# Patient Record
Sex: MALE | Race: WHITE | NOT HISPANIC OR LATINO | Employment: FULL TIME | URBAN - METROPOLITAN AREA
[De-identification: names, ages, dates, MRNs, and addresses within clinical notes are randomized per-mention and may not be internally consistent; named-entity substitution may affect disease eponyms.]

---

## 2017-05-18 ENCOUNTER — OFFICE VISIT (OUTPATIENT)
Dept: FAMILY MEDICINE CLINIC | Facility: CLINIC | Age: 37
End: 2017-05-18

## 2017-05-18 VITALS
TEMPERATURE: 98.2 F | DIASTOLIC BLOOD PRESSURE: 70 MMHG | BODY MASS INDEX: 29.69 KG/M2 | SYSTOLIC BLOOD PRESSURE: 108 MMHG | HEIGHT: 73 IN | OXYGEN SATURATION: 98 % | WEIGHT: 224 LBS | HEART RATE: 68 BPM

## 2017-05-18 DIAGNOSIS — E66.3 OVERWEIGHT: ICD-10-CM

## 2017-05-18 DIAGNOSIS — F41.9 ANXIETY: ICD-10-CM

## 2017-05-18 DIAGNOSIS — Z13.9 SCREENING: ICD-10-CM

## 2017-05-18 DIAGNOSIS — Z63.0 MARITAL PROBLEM: ICD-10-CM

## 2017-05-18 DIAGNOSIS — R53.83 OTHER FATIGUE: ICD-10-CM

## 2017-05-18 DIAGNOSIS — R68.82 LIBIDO, DECREASED: ICD-10-CM

## 2017-05-18 DIAGNOSIS — M54.31 SCIATICA OF RIGHT SIDE: ICD-10-CM

## 2017-05-18 DIAGNOSIS — Z00.00 HEALTH MAINTENANCE EXAMINATION: Primary | ICD-10-CM

## 2017-05-18 DIAGNOSIS — N52.9 ERECTILE DYSFUNCTION, UNSPECIFIED ERECTILE DYSFUNCTION TYPE: ICD-10-CM

## 2017-05-18 PROCEDURE — 99395 PREV VISIT EST AGE 18-39: CPT | Performed by: NURSE PRACTITIONER

## 2017-05-18 RX ORDER — METHYLPREDNISOLONE 4 MG/1
TABLET ORAL
Qty: 21 TABLET | Refills: 0 | Status: SHIPPED | OUTPATIENT
Start: 2017-05-18 | End: 2018-11-07

## 2017-05-18 RX ORDER — SILDENAFIL 100 MG/1
100 TABLET, FILM COATED ORAL DAILY PRN
Qty: 6 TABLET | Refills: 11 | Status: SHIPPED | OUTPATIENT
Start: 2017-05-18 | End: 2017-05-18 | Stop reason: SDUPTHER

## 2017-05-18 RX ORDER — IBUPROFEN 800 MG/1
800 TABLET ORAL EVERY 8 HOURS PRN
Qty: 90 TABLET | Refills: 1 | Status: SHIPPED | OUTPATIENT
Start: 2017-05-18 | End: 2018-11-07

## 2017-05-18 RX ORDER — CETIRIZINE HYDROCHLORIDE 10 MG/1
10 TABLET ORAL DAILY
COMMUNITY
End: 2021-09-08

## 2017-05-18 RX ORDER — IBUPROFEN 800 MG/1
800 TABLET ORAL EVERY 8 HOURS PRN
Qty: 90 TABLET | Refills: 1 | Status: SHIPPED | OUTPATIENT
Start: 2017-05-18 | End: 2017-05-18 | Stop reason: SDUPTHER

## 2017-05-18 RX ORDER — METHYLPREDNISOLONE 4 MG/1
TABLET ORAL
Qty: 21 TABLET | Refills: 0 | Status: SHIPPED | OUTPATIENT
Start: 2017-05-18 | End: 2017-05-18 | Stop reason: SDUPTHER

## 2017-05-18 RX ORDER — SILDENAFIL 100 MG/1
100 TABLET, FILM COATED ORAL DAILY PRN
Qty: 6 TABLET | Refills: 11 | Status: SHIPPED | OUTPATIENT
Start: 2017-05-18 | End: 2022-10-28

## 2017-05-18 SDOH — SOCIAL STABILITY - SOCIAL INSECURITY: PROBLEMS IN RELATIONSHIP WITH SPOUSE OR PARTNER: Z63.0

## 2017-06-09 LAB
25(OH)D3+25(OH)D2 SERPL-MCNC: 26.7 NG/ML (ref 30–100)
ALBUMIN SERPL-MCNC: 4.5 G/DL (ref 3.5–5.2)
ALBUMIN/GLOB SERPL: 1.7 G/DL
ALP SERPL-CCNC: 64 U/L (ref 39–117)
ALT SERPL-CCNC: 16 U/L (ref 1–41)
APPEARANCE UR: CLEAR
AST SERPL-CCNC: 19 U/L (ref 1–40)
BASOPHILS # BLD AUTO: 0.01 10*3/MM3 (ref 0–0.2)
BASOPHILS NFR BLD AUTO: 0.2 % (ref 0–1.5)
BILIRUB SERPL-MCNC: 0.5 MG/DL (ref 0.1–1.2)
BILIRUB UR QL STRIP: NEGATIVE
BUN SERPL-MCNC: 13 MG/DL (ref 6–20)
BUN/CREAT SERPL: 15.9 (ref 7–25)
CALCIUM SERPL-MCNC: 10.1 MG/DL (ref 8.6–10.5)
CHLORIDE SERPL-SCNC: 103 MMOL/L (ref 98–107)
CHOLEST SERPL-MCNC: 147 MG/DL (ref 0–200)
CO2 SERPL-SCNC: 26.9 MMOL/L (ref 22–29)
COLOR UR: YELLOW
CONV COMMENT: ABNORMAL
CREAT SERPL-MCNC: 0.82 MG/DL (ref 0.76–1.27)
EOSINOPHIL # BLD AUTO: 0.15 10*3/MM3 (ref 0–0.7)
EOSINOPHIL NFR BLD AUTO: 2.6 % (ref 0.3–6.2)
ERYTHROCYTE [DISTWIDTH] IN BLOOD BY AUTOMATED COUNT: 13.9 % (ref 11.5–14.5)
FERRITIN SERPL-MCNC: 268.9 NG/ML (ref 30–400)
FOLATE SERPL-MCNC: 14.38 NG/ML (ref 4.78–24.2)
GLOBULIN SER CALC-MCNC: 2.6 GM/DL
GLUCOSE SERPL-MCNC: 112 MG/DL (ref 65–99)
GLUCOSE UR QL: NEGATIVE
HCT VFR BLD AUTO: 43.3 % (ref 40.4–52.2)
HDLC SERPL-MCNC: 52 MG/DL (ref 40–60)
HGB BLD-MCNC: 14.3 G/DL (ref 13.7–17.6)
HGB UR QL STRIP: NEGATIVE
IMM GRANULOCYTES # BLD: 0 10*3/MM3 (ref 0–0.03)
IMM GRANULOCYTES NFR BLD: 0 % (ref 0–0.5)
KETONES UR QL STRIP: NEGATIVE
LDLC SERPL CALC-MCNC: 86 MG/DL (ref 0–100)
LDLC/HDLC SERPL: 1.65 {RATIO}
LEUKOCYTE ESTERASE UR QL STRIP: NEGATIVE
LYMPHOCYTES # BLD AUTO: 2.08 10*3/MM3 (ref 0.9–4.8)
LYMPHOCYTES NFR BLD AUTO: 35.4 % (ref 19.6–45.3)
MCH RBC QN AUTO: 30.8 PG (ref 27–32.7)
MCHC RBC AUTO-ENTMCNC: 33 G/DL (ref 32.6–36.4)
MCV RBC AUTO: 93.3 FL (ref 79.8–96.2)
MONOCYTES # BLD AUTO: 0.53 10*3/MM3 (ref 0.2–1.2)
MONOCYTES NFR BLD AUTO: 9 % (ref 5–12)
NEUTROPHILS # BLD AUTO: 3.11 10*3/MM3 (ref 1.9–8.1)
NEUTROPHILS NFR BLD AUTO: 52.8 % (ref 42.7–76)
NITRITE UR QL STRIP: NEGATIVE
PH UR STRIP: 7.5 [PH] (ref 5–8)
PLATELET # BLD AUTO: 252 10*3/MM3 (ref 140–500)
POTASSIUM SERPL-SCNC: 4.2 MMOL/L (ref 3.5–5.2)
PROT SERPL-MCNC: 7.1 G/DL (ref 6–8.5)
PROT UR QL STRIP: NEGATIVE
RBC # BLD AUTO: 4.64 10*6/MM3 (ref 4.6–6)
SODIUM SERPL-SCNC: 144 MMOL/L (ref 136–145)
SP GR UR: 1.01 (ref 1–1.03)
TESTOST FREE SERPL-MCNC: 10.9 PG/ML (ref 8.7–25.1)
TESTOST SERPL-MCNC: 337 NG/DL (ref 348–1197)
TRIGL SERPL-MCNC: 46 MG/DL (ref 0–150)
TSH SERPL DL<=0.005 MIU/L-ACNC: 2.48 MIU/ML (ref 0.27–4.2)
UROBILINOGEN UR STRIP-MCNC: NORMAL MG/DL
VIT B12 SERPL-MCNC: 445 PG/ML (ref 211–946)
VLDLC SERPL CALC-MCNC: 9.2 MG/DL (ref 5–40)
WBC # BLD AUTO: 5.88 10*3/MM3 (ref 4.5–10.7)

## 2017-06-13 ENCOUNTER — TELEPHONE (OUTPATIENT)
Dept: FAMILY MEDICINE CLINIC | Facility: CLINIC | Age: 37
End: 2017-06-13

## 2017-06-14 NOTE — TELEPHONE ENCOUNTER
His vitamin D is borderline low  Testosterone mixed results  Free testosterone low normal  Total testosterone borderline low  Glucose mildly elevated for fasting 111    We can discuss these further upcoming visit

## 2017-06-15 ENCOUNTER — TELEPHONE (OUTPATIENT)
Dept: FAMILY MEDICINE CLINIC | Facility: CLINIC | Age: 37
End: 2017-06-15

## 2018-11-07 ENCOUNTER — OFFICE VISIT (OUTPATIENT)
Dept: FAMILY MEDICINE CLINIC | Facility: CLINIC | Age: 38
End: 2018-11-07

## 2018-11-07 ENCOUNTER — TELEPHONE (OUTPATIENT)
Dept: FAMILY MEDICINE CLINIC | Facility: CLINIC | Age: 38
End: 2018-11-07

## 2018-11-07 VITALS
BODY MASS INDEX: 34.06 KG/M2 | HEART RATE: 80 BPM | DIASTOLIC BLOOD PRESSURE: 70 MMHG | HEIGHT: 73 IN | WEIGHT: 257 LBS | OXYGEN SATURATION: 98 % | SYSTOLIC BLOOD PRESSURE: 112 MMHG

## 2018-11-07 DIAGNOSIS — F41.9 ANXIETY: ICD-10-CM

## 2018-11-07 DIAGNOSIS — R10.13 EPIGASTRIC PAIN: Primary | ICD-10-CM

## 2018-11-07 LAB
ALBUMIN SERPL-MCNC: 4.4 G/DL (ref 3.5–5.2)
ALBUMIN/GLOB SERPL: 1.5 G/DL
ALP SERPL-CCNC: 64 U/L (ref 39–117)
ALT SERPL-CCNC: 30 U/L (ref 1–41)
AMYLASE SERPL-CCNC: 62 U/L (ref 28–100)
AST SERPL-CCNC: 31 U/L (ref 1–40)
BASOPHILS # BLD AUTO: 0.03 10*3/MM3 (ref 0–0.2)
BASOPHILS NFR BLD AUTO: 0.4 % (ref 0–1.5)
BILIRUB SERPL-MCNC: 0.7 MG/DL (ref 0.1–1.2)
BUN SERPL-MCNC: 14 MG/DL (ref 6–20)
BUN/CREAT SERPL: 14.7 (ref 7–25)
CALCIUM SERPL-MCNC: 10.2 MG/DL (ref 8.6–10.5)
CHLORIDE SERPL-SCNC: 102 MMOL/L (ref 98–107)
CO2 SERPL-SCNC: 28.2 MMOL/L (ref 22–29)
CREAT SERPL-MCNC: 0.95 MG/DL (ref 0.76–1.27)
EOSINOPHIL # BLD AUTO: 0.18 10*3/MM3 (ref 0–0.7)
EOSINOPHIL NFR BLD AUTO: 2.5 % (ref 0.3–6.2)
ERYTHROCYTE [DISTWIDTH] IN BLOOD BY AUTOMATED COUNT: 13.3 % (ref 11.5–14.5)
GLOBULIN SER CALC-MCNC: 2.9 GM/DL
GLUCOSE SERPL-MCNC: 113 MG/DL (ref 65–99)
HCT VFR BLD AUTO: 47.3 % (ref 40.4–52.2)
HGB BLD-MCNC: 15.3 G/DL (ref 13.7–17.6)
IMM GRANULOCYTES # BLD: 0.06 10*3/MM3 (ref 0–0.03)
IMM GRANULOCYTES NFR BLD: 0.8 % (ref 0–0.5)
LIPASE SERPL-CCNC: 28 U/L (ref 13–60)
LYMPHOCYTES # BLD AUTO: 2 10*3/MM3 (ref 0.9–4.8)
LYMPHOCYTES NFR BLD AUTO: 27.4 % (ref 19.6–45.3)
MCH RBC QN AUTO: 30.9 PG (ref 27–32.7)
MCHC RBC AUTO-ENTMCNC: 32.3 G/DL (ref 32.6–36.4)
MCV RBC AUTO: 95.6 FL (ref 79.8–96.2)
MONOCYTES # BLD AUTO: 0.72 10*3/MM3 (ref 0.2–1.2)
MONOCYTES NFR BLD AUTO: 9.8 % (ref 5–12)
NEUTROPHILS # BLD AUTO: 4.32 10*3/MM3 (ref 1.9–8.1)
NEUTROPHILS NFR BLD AUTO: 59.1 % (ref 42.7–76)
PLATELET # BLD AUTO: 305 10*3/MM3 (ref 140–500)
POTASSIUM SERPL-SCNC: 4.6 MMOL/L (ref 3.5–5.2)
PROT SERPL-MCNC: 7.3 G/DL (ref 6–8.5)
RBC # BLD AUTO: 4.95 10*6/MM3 (ref 4.6–6)
SODIUM SERPL-SCNC: 143 MMOL/L (ref 136–145)
WBC # BLD AUTO: 7.31 10*3/MM3 (ref 4.5–10.7)

## 2018-11-07 PROCEDURE — 99214 OFFICE O/P EST MOD 30 MIN: CPT | Performed by: NURSE PRACTITIONER

## 2018-11-07 RX ORDER — OMEPRAZOLE 40 MG/1
40 CAPSULE, DELAYED RELEASE ORAL DAILY
Qty: 30 CAPSULE | Refills: 1 | Status: SHIPPED | OUTPATIENT
Start: 2018-11-07 | End: 2018-12-10 | Stop reason: SDUPTHER

## 2018-11-07 RX ORDER — DICYCLOMINE HYDROCHLORIDE 10 MG/1
10 CAPSULE ORAL
Qty: 45 CAPSULE | Refills: 2 | Status: SHIPPED | OUTPATIENT
Start: 2018-11-07 | End: 2018-11-07 | Stop reason: CLARIF

## 2018-11-07 NOTE — PROGRESS NOTES
Subjective   Jeancarlos Zheng is a 38 y.o. male.     Epigastric discomfort and bloating mid abdomen sometimes right upper quadrant after meals less to 3 months  Might be anxiety related recent divorce  Ex-wife has been angry not behaving appropriately sending messages to his employer  With erroneous postings social media  Has not treated this with medication    2 daughters  19, the other highschool  Loves them very much  Trying to communicate with them as much as possible    Feels overwhelmed  Weight gain    Social history  As above  No drugs  Alcohol a little more than previous  3-4 beers in the evenings most days           The following portions of the patient's history were reviewed and updated as appropriate: allergies, current medications, past family history, past medical history, past surgical history and problem list.    Review of Systems   Constitutional: Negative for chills, diaphoresis, fatigue and fever.   Gastrointestinal: Positive for abdominal pain. Negative for abdominal distention, anal bleeding, blood in stool, constipation, diarrhea, nausea, rectal pain, vomiting, GERD and indigestion.   Psychiatric/Behavioral: The patient is nervous/anxious.    All other systems reviewed and are negative.      Objective   Physical Exam   Constitutional: He is oriented to person, place, and time. He appears well-developed and well-nourished. No distress.   HENT:   Head: Normocephalic and atraumatic.   Nose: Nose normal.   Mouth/Throat: Oropharynx is clear and moist.   Eyes: Pupils are equal, round, and reactive to light. Conjunctivae are normal. No scleral icterus.   Neck: Neck supple. No JVD present. No thyromegaly present.   Cardiovascular: Normal rate, regular rhythm and normal heart sounds.  Exam reveals no gallop and no friction rub.    No murmur heard.  Pulmonary/Chest: Effort normal and breath sounds normal. No respiratory distress. He has no wheezes. He has no rales.   Abdominal: Soft. Bowel sounds are normal.  He exhibits no distension and no mass. There is no tenderness. There is no guarding. No hernia.   Musculoskeletal: He exhibits no edema or tenderness.   Lymphadenopathy:     He has no cervical adenopathy.   Neurological: He is alert and oriented to person, place, and time. He has normal reflexes.   Skin: Skin is warm and dry. No rash noted. He is not diaphoretic. No erythema.   Psychiatric: He has a normal mood and affect. His behavior is normal. Judgment and thought content normal.   Vitals reviewed.        Assessment/Plan   Jeancarlos was seen today for stomach bloating [place] after eating.    Diagnoses and all orders for this visit:    Epigastric pain  -     Comprehensive Metabolic Panel  -     CBC & Differential  -     Lipase  -     Amylase  -     US Gallbladder    Anxiety  -     Comprehensive Metabolic Panel  -     CBC & Differential  -     Lipase  -     Amylase  -     US Gallbladder    Other orders  -     omeprazole (priLOSEC) 40 MG capsule; Take 1 capsule by mouth Daily.  -     dicyclomine (BENTYL) 10 MG capsule; Take 1 capsule by mouth 4 (Four) Times a Day Before Meals & at Bedtime. As needed for spasms                    Continuance stressed importance of good communication with his daughters  Understand this is quite traumatic event any age  Especially bothers    When possible  And continue to try  For peace between his ex-wife  She will always be there mother  He will always be the father    He is a new relationship  This will be difficult for his kids  Continued understand this    Change diet  Temporarily PPI  Trial antispasmodic  Outpatient ultrasound  Severe pain frequent vomiting coffee-ground emesis black tarry stools emergency room    Short-term follow-up  EGD if not improved    Discharge instructions    [place] diet more small frequent meals  Avoid alcohol  Spicy greasy fatty foods decrease    2000 rox a day    Decrease processed sweets breads pasta sugars  Increase healthy proteins beans, fish  chicken  Olive oil

## 2018-11-07 NOTE — TELEPHONE ENCOUNTER
Call from pharmacy dicyclomine is on backorder would like to change to something else. Has been on backorder for a while now not sure when they will get medication in.    Please advise

## 2018-11-07 NOTE — TELEPHONE ENCOUNTER
If the 20 mg  dicyclomine available then substitute    If not give generic Levsin 0.125 mg sublingual  3 times a day as needed before meals for spasms  Dispense 45+2 refills   Update chart     thx

## 2018-11-07 NOTE — PATIENT INSTRUCTIONS
Discharge instructions    [place] diet more small frequent meals  Avoid alcohol  Spicy greasy fatty foods decrease    2000 rox a day    Decrease processed sweets breads pasta sugars  Increase healthy proteins beans, fish chicken  Olive oil

## 2018-11-21 ENCOUNTER — APPOINTMENT (OUTPATIENT)
Dept: ULTRASOUND IMAGING | Facility: HOSPITAL | Age: 38
End: 2018-11-21

## 2018-11-26 ENCOUNTER — HOSPITAL ENCOUNTER (OUTPATIENT)
Dept: ULTRASOUND IMAGING | Facility: HOSPITAL | Age: 38
Discharge: HOME | End: 2018-11-26
Admitting: NURSE PRACTITIONER

## 2018-11-26 PROCEDURE — 76705 ECHO EXAM OF ABDOMEN: CPT

## 2018-11-30 ENCOUNTER — TELEPHONE (OUTPATIENT)
Dept: FAMILY MEDICINE CLINIC | Facility: CLINIC | Age: 38
End: 2018-11-30

## 2018-11-30 NOTE — TELEPHONE ENCOUNTER
Please tell patient gallbladder ultrasound negative for stones or obstruction  He should follow instructions given the office    Possibly fatty deposits in the liver  Treatment for this is healthy weight  Decreasing processed sweets  Probably should repeat his ultrasound in a couple years of the liver  Regarding fatty liver    Fatty liver can cause cirrhosis  , Important dietary changes  And gradual healthy weight loss heart healthy diet

## 2018-12-10 ENCOUNTER — OFFICE VISIT (OUTPATIENT)
Dept: FAMILY MEDICINE CLINIC | Facility: CLINIC | Age: 38
End: 2018-12-10

## 2018-12-10 VITALS
HEART RATE: 89 BPM | BODY MASS INDEX: 34.06 KG/M2 | DIASTOLIC BLOOD PRESSURE: 80 MMHG | HEIGHT: 73 IN | WEIGHT: 257 LBS | SYSTOLIC BLOOD PRESSURE: 111 MMHG | OXYGEN SATURATION: 94 %

## 2018-12-10 DIAGNOSIS — F41.9 ANXIETY: Primary | ICD-10-CM

## 2018-12-10 DIAGNOSIS — R10.13 DYSPEPSIA: ICD-10-CM

## 2018-12-10 PROCEDURE — 99213 OFFICE O/P EST LOW 20 MIN: CPT | Performed by: NURSE PRACTITIONER

## 2018-12-10 RX ORDER — HYDROXYZINE HYDROCHLORIDE 25 MG/1
TABLET, FILM COATED ORAL
Qty: 30 TABLET | Refills: 2 | Status: SHIPPED | OUTPATIENT
Start: 2018-12-10 | End: 2021-09-08

## 2018-12-10 RX ORDER — OMEPRAZOLE 40 MG/1
40 CAPSULE, DELAYED RELEASE ORAL DAILY
Qty: 30 CAPSULE | Refills: 1 | Status: SHIPPED | OUTPATIENT
Start: 2018-12-10 | End: 2019-07-12 | Stop reason: SDUPTHER

## 2018-12-10 NOTE — PROGRESS NOTES
Subjective   Jeancarlos Zheng is a 38 y.o. male.     F/u epigastric pain  Epigastric pain with dinner meals  Bloating pain better but still bloating  Gallbladder ultrasound normal  cuting back with diet, making better choices  Generic Levsin did not help  Presently no abdominal pain no weakness no fever no chills no coffee-ground emesis no black tarry stools    No night sweats  Weight-loss  No lower abdominal pain or diarrhea    Try and eat better lose weight  Anxiety irritability stress  No agitation no severe depression  May be occasionally needs something to help relax           The following portions of the patient's history were reviewed and updated as appropriate: allergies, current medications, past family history, past medical history, past social history, past surgical history and problem list.    Review of Systems   Constitutional: Negative for chills, diaphoresis, fatigue and fever.   Gastrointestinal: Positive for abdominal pain, GERD and indigestion.   Psychiatric/Behavioral: The patient is nervous/anxious.    All other systems reviewed and are negative.      Objective   Physical Exam   Constitutional: He is oriented to person, place, and time. He appears well-developed and well-nourished. No distress.   HENT:   Head: Normocephalic and atraumatic.   Nose: Nose normal.   Mouth/Throat: Oropharynx is clear and moist.   Eyes: Conjunctivae are normal. Pupils are equal, round, and reactive to light. No scleral icterus.   Neck: Neck supple. No JVD present. No thyromegaly present.   Cardiovascular: Normal rate, regular rhythm and normal heart sounds. Exam reveals no gallop and no friction rub.   No murmur heard.  Pulmonary/Chest: Effort normal and breath sounds normal. No respiratory distress. He has no wheezes. He has no rales.   Abdominal: Soft. Bowel sounds are normal. He exhibits no distension and no mass. There is no tenderness. There is no guarding. No hernia.   Musculoskeletal: He exhibits no edema or  tenderness.   Lymphadenopathy:     He has no cervical adenopathy.   Neurological: He is alert and oriented to person, place, and time. He has normal reflexes.   Skin: Skin is warm and dry. No rash noted. He is not diaphoretic. No erythema.   Psychiatric: He has a normal mood and affect. His behavior is normal. Judgment and thought content normal.   Vitals reviewed.        Assessment/Plan   Jeancarlos was seen today for follow-up on epigastric pain.    Diagnoses and all orders for this visit:    Anxiety    Dyspepsia    Other orders  -     omeprazole (priLOSEC) 40 MG capsule; Take 1 capsule by mouth Daily.  -     hydrOXYzine (ATARAX) 25 MG tablet; 1-2 tablets 3 times a day as needed for anxiety caution sedation                      Discontinue generic Levsin  This medication is for spasms or intestinal cramping  Okay to resume if having intestinal cramping and if helps    Continue omeprazole another 4 weeks  Then every other day for 2 weeks then discontinue      If symptoms continue  Are not resolved within 4 weeks see gastroenterology    If increased or more frequent epigastric pain discomfort  If severe emergency room otherwise see gastroenterology call for referral      Continue lifestyle changes  Start exercising gradually  2000 rox daily  Decrease alcohol

## 2018-12-10 NOTE — PATIENT INSTRUCTIONS
Discontinue generic Levsin  This medication is for spasms or intestinal cramping  Okay to resume if having intestinal cramping and if helps    Continue omeprazole another 4 weeks  Then every other day for 2 weeks then discontinue      If symptoms continue  Are not resolved within 4 weeks see gastroenterology    If increased or more frequent epigastric pain discomfort  If severe emergency room otherwise see gastroenterology call for referral      Continue lifestyle changes  Start exercising gradually  2000 rox daily  Decrease alcohol

## 2019-07-12 RX ORDER — OMEPRAZOLE 40 MG/1
40 CAPSULE, DELAYED RELEASE ORAL DAILY
Qty: 30 CAPSULE | Refills: 1 | Status: SHIPPED | OUTPATIENT
Start: 2019-07-12 | End: 2019-08-30 | Stop reason: SDUPTHER

## 2019-08-30 ENCOUNTER — OFFICE VISIT (OUTPATIENT)
Dept: FAMILY MEDICINE CLINIC | Facility: CLINIC | Age: 39
End: 2019-08-30

## 2019-08-30 VITALS
HEIGHT: 73 IN | WEIGHT: 263 LBS | SYSTOLIC BLOOD PRESSURE: 130 MMHG | HEART RATE: 93 BPM | DIASTOLIC BLOOD PRESSURE: 70 MMHG | BODY MASS INDEX: 34.85 KG/M2 | OXYGEN SATURATION: 98 %

## 2019-08-30 DIAGNOSIS — R10.9 ABDOMINAL BLOATING WITH CRAMPS: Primary | ICD-10-CM

## 2019-08-30 DIAGNOSIS — R14.0 ABDOMINAL BLOATING WITH CRAMPS: Primary | ICD-10-CM

## 2019-08-30 DIAGNOSIS — F41.9 ANXIETY: ICD-10-CM

## 2019-08-30 DIAGNOSIS — R19.7 DIARRHEA, UNSPECIFIED TYPE: ICD-10-CM

## 2019-08-30 PROCEDURE — 99214 OFFICE O/P EST MOD 30 MIN: CPT | Performed by: NURSE PRACTITIONER

## 2019-08-30 RX ORDER — DICYCLOMINE HCL 20 MG
20 TABLET ORAL
Qty: 60 TABLET | Refills: 2 | Status: SHIPPED | OUTPATIENT
Start: 2019-08-30 | End: 2019-11-08 | Stop reason: SDUPTHER

## 2019-08-30 RX ORDER — OMEPRAZOLE 40 MG/1
40 CAPSULE, DELAYED RELEASE ORAL DAILY
Qty: 30 CAPSULE | Refills: 5 | Status: SHIPPED | OUTPATIENT
Start: 2019-08-30 | End: 2020-03-23

## 2019-08-30 NOTE — PATIENT INSTRUCTIONS
Discharge instructions    See if he can gradually increase your buspirone with your mental health professional  To improve your anxiety level    Dietary changes to manage abdominal cramping and discomfort bloating diarrhea  Antispasmodic at least temporarily 20 mg 3 times a day  Before meals  Take consistently for now    Follow-up gastroenterology for an evaluation    If severe pain fever coffee-ground emesis black tarry stools emergency room      FODMOT diet    Avoid spicy greasy foods smaller meals  Such as 5 meals day     Avoid high sugary substances high fatty substances spicy certain types of bunion etc. the may irritate to    Some foods per day is excessive gas you can try simethicone as well  mg 3 times a day and/or Lactaid    Watch for dairy cheeses which may cause gas discomfort although these may not affect you    Low-FODMAP Eating Plan    FODMAPs (fermentable oligosaccharides, disaccharides, monosaccharides, and polyols) are sugars that are hard for some people to digest. A low-FODMAP eating plan may help some people who have bowel (intestinal) diseases to manage their symptoms.  This meal plan can be complicated to follow. Work with a diet and nutrition specialist (dietitian) to make a low-FODMAP eating plan that is right for you. A dietitian can make sure that you get enough nutrition from this diet.  What are tips for following this plan?  Reading food labels  · Check labels for hidden FODMAPs such as:  ? High-fructose syrup.  ? Honey.  ? Agave.  ? Natural fruit flavors.  ? Onion or garlic powder.  · Choose low-FODMAP foods that contain 3-4 grams of fiber per serving.  · Check food labels for serving sizes. Eat only one serving at a time to make sure FODMAP levels stay low.  Meal planning  · Follow a low-FODMAP eating plan for up to 6 weeks, or as told by your health care provider or dietitian.  · To follow the eating plan:  1. Eliminate high-FODMAP foods from your diet completely.  2. Gradually  reintroduce high-FODMAP foods into your diet one at a time. Most people should wait a few days after introducing one high-FODMAP food before they introduce the next high-FODMAP food. Your dietitian can recommend how quickly you may reintroduce foods.  3. Keep a daily record of what you eat and drink, and make note of any symptoms that you have after eating.  4. Review your daily record with a dietitian regularly. Your dietitian can help you identify which foods you can eat and which foods you should avoid.  General tips  · Drink enough fluid each day to keep your urine pale yellow.  · Avoid processed foods. These often have added sugar and may be high in FODMAPs.  · Avoid most dairy products, whole grains, and sweeteners.  · Work with a dietitian to make sure you get enough fiber in your diet.  Recommended foods  Grains  · Gluten-free grains, such as rice, oats, buckwheat, quinoa, corn, polenta, and millet. Gluten-free pasta, bread, or cereal. Rice noodles. Corn tortillas.  Vegetables  · Eggplant, zucchini, cucumber, peppers, green beans, [place] sprouts, bean sprouts, lettuce, arugula, kale, Swiss chard, spinach, pam greens, bok saud, summer squash, potato, and tomato. Limited amounts of corn, carrot, and sweet potato. Green parts of scallions.  Fruits  · Bananas, oranges, caesar, limes, blueberries, raspberries, strawberries, grapes, cantaloupe, honeydew melon, kiwi, papaya, passion fruit, and pineapple. Limited amounts of dried cranberries, banana chips, and shredded coconut.  Dairy  · Lactose-free milk, yogurt, and kefir. Lactose-free cottage cheese and ice cream. Non-dairy milks, such as almond, coconut, hemp, and rice milk. Yogurts made of non-dairy milks. Limited amounts of goat cheese, brie, mozzarella, parmesan, swiss, and other hard cheeses.  Meats and other protein foods  · Unseasoned beef, pork, poultry, or fish. Eggs. [place]. Tofu (firm) and tempeh. Limited amounts of nuts and seeds, such as  "almonds, walnuts, brazil nuts, pecans, peanuts, pumpkin seeds, libia seeds, and sunflower seeds.  Fats and oils  · Butter-free spreads. Vegetable oils, such as olive, canola, and sunflower oil.  Seasoning and other foods  · Artificial sweeteners with names that do not end in \"ol\" such as aspartame, saccharine, and stevia. Maple syrup, white table sugar, raw sugar, brown sugar, and molasses. Fresh basil, coriander, parsley, rosemary, and thyme.  Beverages  · Water and mineral water. Sugar-sweetened soft drinks. Small amounts of orange juice or cranberry juice. Black and green tea. Most dry fariba. Coffee.  This may not be a complete list of low-FODMAP foods. Talk with your dietitian for more information.  Foods to avoid  Grains  · Wheat, including kamut, durum, and semolina. Barley and bulgur. Couscous. Wheat-based cereals. Wheat noodles, bread, crackers, and pastries.  Vegetables  · Chicory root, artichoke, asparagus, cabbage, snow peas, sugar snap peas, mushrooms, and cauliflower. Onions, garlic, leeks, and the white part of scallions.  Fruits  · Fresh, dried, and juiced forms of apple, pear, watermelon, peach, plum, cherries, apricots, blackberries, boysenberries, figs, nectarines, and tayla. Avocado.  Dairy  · Milk, yogurt, ice cream, and soft cheese. Cream and sour cream. Milk-based sauces. Custard.  Meats and other protein foods  · Fried or fatty meat. Sausage. Cashews and pistachios. Soybeans, baked beans, black beans, chickpeas, kidney beans, angelica beans, navy beans, lentils, and split peas.  Seasoning and other foods  · Any sugar-free gum or candy. Foods that contain artificial sweeteners such as sorbitol, mannitol, isomalt, or xylitol. Foods that contain honey, high-fructose corn syrup, or agave. Bouillon, vegetable stock, beef stock, and chicken stock. Garlic and onion powder. Condiments made with onion, such as hummus, chutney, pickles, relish, salad dressing, and salsa. Tomato " paste.  Beverages  · Chicory-based drinks. Coffee substitutes. Chamomile tea. Fennel tea. Sweet or fortified fariba such as port or tahmina. Diet soft drinks made with isomalt, mannitol, maltitol, sorbitol, or xylitol. Apple, pear, and tayla juice. Juices with high-fructose corn syrup.  This may not be a complete list of high-FODMAP foods. Talk with your dietitian to discuss what dietary choices are best for you.   Summary  · A low-FODMAP eating plan is a short-term diet that eliminates FODMAPs from your diet to help ease symptoms of certain bowel diseases.  · The eating plan usually lasts up to 6 weeks. After that, high-FODMAP foods are restarted gradually, one at a time, so you can find out which may be causing symptoms.  · A low-FODMAP eating plan can be complicated. It is best to work with a dietitian who has experience with this type of plan.  This information is not intended to replace advice given to you by your health care provider. Make sure you discuss any questions you have with your health care provider.  Document Released: 08/14/2018 Document Revised: 08/14/2018 Document Reviewed: 08/14/2018  [employer] Interactive Patient Education © 2019 [employer]

## 2019-08-30 NOTE — PROGRESS NOTES
Subjective   Jeancarlos Zheng is a 38 y.o. male.     Patient complains of abdominal bloating mild discomfort cramping diarrhea   last 3 or 4 days.  No severe pain no abdominal pain presently no fever no chills some nausea but no vomiting  No fever  No recent overseas trips recently went to [place] and last week drank throughout the day on the beach  With vacation eating 1 week but no problems  Came back had [employer]  Saturday night  Then Sunday Monday developed some cramping upset stomach  Quite a bit of bloating and gas    He has had chronic abdominal cramping and diarrhea IBS symptoms over the last year  He was seen last fall with epigastric pain  Acute, negative ultrasound gallbladder pancreatic test were negative at that time  Any responded on omeprazole he is still taking it helps    But he is also had abdominal cramping dietary intolerances over the last several years quite a bit of stress anxiety sees psychiatry presently BuSpar has been helping some  His epigastric pain is been much better resolved he was so happy with that  He has not had a colonoscopy            Abdominal Pain   Associated symptoms include diarrhea. Pertinent negatives include no fever.   Diarrhea    Associated symptoms include abdominal pain. Pertinent negatives include no coughing or fever.        The following portions of the patient's history were reviewed and updated as appropriate: allergies, current medications, past family history, past medical history, past social history, past surgical history and problem list.    Review of Systems   Constitutional: Negative for fatigue and fever.   HENT: Negative.  Negative for trouble swallowing.    Eyes: Negative.    Respiratory: Negative.  Negative for cough and shortness of breath.    Cardiovascular: Negative for chest pain, palpitations and leg swelling.   Gastrointestinal: Positive for abdominal pain and diarrhea.   Genitourinary: Negative.    Musculoskeletal: Negative.    Skin: Negative.     Neurological: Negative.  Negative for dizziness and confusion.   Psychiatric/Behavioral: Negative.        Objective   Physical Exam   Constitutional: He is oriented to person, place, and time. He appears well-developed and well-nourished. No distress.   Pleasant appears well   HENT:   Head: Normocephalic and atraumatic.   Nose: Nose normal.   Mouth/Throat: Oropharynx is clear and moist.   Eyes: Conjunctivae are normal. Pupils are equal, round, and reactive to light.   Neck: Neck supple. No JVD present.   Cardiovascular: Normal rate, regular rhythm and normal heart sounds.   No murmur heard.  Pulmonary/Chest: Effort normal and breath sounds normal. No respiratory distress. He has no wheezes.   Abdominal: Soft. Bowel sounds are normal. He exhibits no distension and no mass. There is no tenderness. There is no rebound and no guarding. No hernia.   Normal abdominal exam no hepatosplenomegaly nontender   Musculoskeletal: He exhibits no edema or tenderness.   Lymphadenopathy:     He has no cervical adenopathy.   Neurological: He is alert and oriented to person, place, and time.   Skin: Skin is warm and dry. He is not diaphoretic.   Psychiatric: He has a normal mood and affect. His behavior is normal. Judgment and thought content normal.   Vitals reviewed.        Assessment/Plan   Jeancarlos was seen today for abdominal pain and diarrhea.    Diagnoses and all orders for this visit:    Abdominal bloating with cramps  -     CBC & Differential  -     Comprehensive Metabolic Panel    Diarrhea, unspecified type  -     CBC & Differential  -     Comprehensive Metabolic Panel    Other orders  -     dicyclomine (BENTYL) 20 MG tablet; Take 1 tablet by mouth 3 (Three) Times a Day Before Meals. As needed cramp belly  -     omeprazole (priLOSEC) 40 MG capsule; Take 1 capsule by mouth Daily.                  Patient Instructions   Discharge instructions    See if he can gradually increase your buspirone with your mental health  professional  To improve your anxiety level    Dietary changes to manage abdominal cramping and discomfort bloating diarrhea  Antispasmodic at least temporarily 20 mg 3 times a day  Before meals  Take consistently for now    Follow-up gastroenterology for an evaluation    If severe pain fever coffee-ground emesis black tarry stools emergency room      FODMOT diet    Avoid spicy greasy foods smaller meals  Such as 5 meals day     Avoid high sugary substances high fatty substances spicy certain types of bunion etc. the may irritate to    Some foods per day is excessive gas you can try simethicone as well  mg 3 times a day and/or Lactaid    Watch for dairy cheeses which may cause gas discomfort although these may not affect you    Low-FODMAP Eating Plan    FODMAPs (fermentable oligosaccharides, disaccharides, monosaccharides, and polyols) are sugars that are hard for some people to digest. A low-FODMAP eating plan may help some people who have bowel (intestinal) diseases to manage their symptoms.  This meal plan can be complicated to follow. Work with a diet and nutrition specialist (dietitian) to make a low-FODMAP eating plan that is right for you. A dietitian can make sure that you get enough nutrition from this diet.  What are tips for following this plan?  Reading food labels  · Check labels for hidden FODMAPs such as:  ? High-fructose syrup.  ? Honey.  ? Agave.  ? Natural fruit flavors.  ? Onion or garlic powder.  · Choose low-FODMAP foods that contain 3-4 grams of fiber per serving.  · Check food labels for serving sizes. Eat only one serving at a time to make sure FODMAP levels stay low.  Meal planning  · Follow a low-FODMAP eating plan for up to 6 weeks, or as told by your health care provider or dietitian.  · To follow the eating plan:  1. Eliminate high-FODMAP foods from your diet completely.  2. Gradually reintroduce high-FODMAP foods into your diet one at a time. Most people should wait a few days  after introducing one high-FODMAP food before they introduce the next high-FODMAP food. Your dietitian can recommend how quickly you may reintroduce foods.  3. Keep a daily record of what you eat and drink, and make note of any symptoms that you have after eating.  4. Review your daily record with a dietitian regularly. Your dietitian can help you identify which foods you can eat and which foods you should avoid.  General tips  · Drink enough fluid each day to keep your urine pale yellow.  · Avoid processed foods. These often have added sugar and may be high in FODMAPs.  · Avoid most dairy products, whole grains, and sweeteners.  · Work with a dietitian to make sure you get enough fiber in your diet.  Recommended foods  Grains  · Gluten-free grains, such as rice, oats, buckwheat, quinoa, corn, polenta, and millet. Gluten-free pasta, bread, or cereal. Rice noodles. Corn tortillas.  Vegetables  · Eggplant, zucchini, cucumber, peppers, green beans, [place] sprouts, bean sprouts, lettuce, arugula, kale, Swiss chard, spinach, pam greens, bok saud, summer squash, potato, and tomato. Limited amounts of corn, carrot, and sweet potato. Green parts of scallions.  Fruits  · Bananas, oranges, caesar, limes, blueberries, raspberries, strawberries, grapes, cantaloupe, honeydew melon, kiwi, papaya, passion fruit, and pineapple. Limited amounts of dried cranberries, banana chips, and shredded coconut.  Dairy  · Lactose-free milk, yogurt, and kefir. Lactose-free cottage cheese and ice cream. Non-dairy milks, such as almond, coconut, hemp, and rice milk. Yogurts made of non-dairy milks. Limited amounts of goat cheese, brie, mozzarella, parmesan, swiss, and other hard cheeses.  Meats and other protein foods  · Unseasoned beef, pork, poultry, or fish. Eggs. [place]. Tofu (firm) and tempeh. Limited amounts of nuts and seeds, such as almonds, walnuts, brazil nuts, pecans, peanuts, pumpkin seeds, libia seeds, and sunflower seeds.  Fats  "and oils  · Butter-free spreads. Vegetable oils, such as olive, canola, and sunflower oil.  Seasoning and other foods  · Artificial sweeteners with names that do not end in \"ol\" such as aspartame, saccharine, and stevia. Maple syrup, white table sugar, raw sugar, brown sugar, and molasses. Fresh basil, coriander, parsley, rosemary, and thyme.  Beverages  · Water and mineral water. Sugar-sweetened soft drinks. Small amounts of orange juice or cranberry juice. Black and green tea. Most dry fariba. Coffee.  This may not be a complete list of low-FODMAP foods. Talk with your dietitian for more information.  Foods to avoid  Grains  · Wheat, including kamut, durum, and semolina. Barley and bulgur. Couscous. Wheat-based cereals. Wheat noodles, bread, crackers, and pastries.  Vegetables  · Chicory root, artichoke, asparagus, cabbage, snow peas, sugar snap peas, mushrooms, and cauliflower. Onions, garlic, leeks, and the white part of scallions.  Fruits  · Fresh, dried, and juiced forms of apple, pear, watermelon, peach, plum, cherries, apricots, blackberries, boysenberries, figs, nectarines, and tayla. Avocado.  Dairy  · Milk, yogurt, ice cream, and soft cheese. Cream and sour cream. Milk-based sauces. Custard.  Meats and other protein foods  · Fried or fatty meat. Sausage. Cashews and pistachios. Soybeans, baked beans, black beans, chickpeas, kidney beans, angelica beans, navy beans, lentils, and split peas.  Seasoning and other foods  · Any sugar-free gum or candy. Foods that contain artificial sweeteners such as sorbitol, mannitol, isomalt, or xylitol. Foods that contain honey, high-fructose corn syrup, or agave. Bouillon, vegetable stock, beef stock, and chicken stock. Garlic and onion powder. Condiments made with onion, such as hummus, chutney, pickles, relish, salad dressing, and salsa. Tomato paste.  Beverages  · Chicory-based drinks. Coffee substitutes. Chamomile tea. Fennel tea. Sweet or fortified fariba such as port or " tahmina. Diet soft drinks made with isomalt, mannitol, maltitol, sorbitol, or xylitol. Apple, pear, and tayla juice. Juices with high-fructose corn syrup.  This may not be a complete list of high-FODMAP foods. Talk with your dietitian to discuss what dietary choices are best for you.   Summary  · A low-FODMAP eating plan is a short-term diet that eliminates FODMAPs from your diet to help ease symptoms of certain bowel diseases.  · The eating plan usually lasts up to 6 weeks. After that, high-FODMAP foods are restarted gradually, one at a time, so you can find out which may be causing symptoms.  · A low-FODMAP eating plan can be complicated. It is best to work with a dietitian who has experience with this type of plan.  This information is not intended to replace advice given to you by your health care provider. Make sure you discuss any questions you have with your health care provider.  Document Released: 08/14/2018 Document Revised: 08/14/2018 Document Reviewed: 08/14/2018  [employer] Patient Education © 2019 [employer]

## 2019-11-08 RX ORDER — DICYCLOMINE HCL 20 MG
TABLET ORAL
Qty: 60 TABLET | Refills: 1 | Status: SHIPPED | OUTPATIENT
Start: 2019-11-08 | End: 2020-02-17

## 2020-02-17 RX ORDER — DICYCLOMINE HCL 20 MG
TABLET ORAL
Qty: 60 TABLET | Refills: 0 | Status: SHIPPED | OUTPATIENT
Start: 2020-02-17 | End: 2020-03-23

## 2020-03-23 RX ORDER — DICYCLOMINE HCL 20 MG
TABLET ORAL
Qty: 60 TABLET | Refills: 5 | Status: SHIPPED | OUTPATIENT
Start: 2020-03-23 | End: 2021-09-08

## 2020-03-23 RX ORDER — OMEPRAZOLE 40 MG/1
CAPSULE, DELAYED RELEASE ORAL
Qty: 30 CAPSULE | Refills: 11 | Status: SHIPPED | OUTPATIENT
Start: 2020-03-23 | End: 2020-08-20 | Stop reason: SDUPTHER

## 2020-08-20 ENCOUNTER — OFFICE VISIT (OUTPATIENT)
Dept: FAMILY MEDICINE CLINIC | Facility: CLINIC | Age: 40
End: 2020-08-20

## 2020-08-20 VITALS
HEART RATE: 81 BPM | WEIGHT: 266 LBS | SYSTOLIC BLOOD PRESSURE: 126 MMHG | TEMPERATURE: 96.3 F | OXYGEN SATURATION: 97 % | DIASTOLIC BLOOD PRESSURE: 82 MMHG | HEIGHT: 73 IN | BODY MASS INDEX: 35.25 KG/M2

## 2020-08-20 DIAGNOSIS — R10.9 ABDOMINAL CRAMPING: ICD-10-CM

## 2020-08-20 DIAGNOSIS — Z00.00 HEALTH MAINTENANCE EXAMINATION: Primary | ICD-10-CM

## 2020-08-20 DIAGNOSIS — E66.01 MORBID (SEVERE) OBESITY DUE TO EXCESS CALORIES (HCC): ICD-10-CM

## 2020-08-20 DIAGNOSIS — R19.5 LOOSE STOOLS: ICD-10-CM

## 2020-08-20 PROCEDURE — 99395 PREV VISIT EST AGE 18-39: CPT | Performed by: NURSE PRACTITIONER

## 2020-08-20 RX ORDER — OMEPRAZOLE 40 MG/1
40 CAPSULE, DELAYED RELEASE ORAL DAILY
Qty: 90 CAPSULE | Refills: 1 | Status: SHIPPED | OUTPATIENT
Start: 2020-08-20 | End: 2021-02-17 | Stop reason: SDUPTHER

## 2020-08-20 RX ORDER — BUSPIRONE HYDROCHLORIDE 10 MG/1
10 TABLET ORAL DAILY
Qty: 90 TABLET | Refills: 1 | Status: SHIPPED | OUTPATIENT
Start: 2020-08-20 | End: 2021-02-17 | Stop reason: SDUPTHER

## 2020-08-20 NOTE — PATIENT INSTRUCTIONS
Discharge instructions    Smaller more frequent meals greatly decrease sugary foods fatty foods spicy foods onions etc.  Consider food diary to see what bothers you more than other things you need a work-up including an EGD and colonoscopy likely and possibly a HIDA scan to further evaluate your gallbladder  Your gastroenterologist will work with you on this and make sure you have good follow-ups you likely need at least an extended follow-up    Encourage you weight loss over the next year 1500 rox-maximum 2000/day mostly vegetables chicken fish    Consider  fodmod diet was tries to eliminate gassy foods   And slowly reintroduce those    Severe abdominal pain weakness fever emergency room  Follow-up 6 to 12 months yearly annual physical

## 2020-08-21 LAB
ALBUMIN SERPL-MCNC: 4.9 G/DL (ref 3.5–5.2)
ALBUMIN/GLOB SERPL: 2.2 G/DL
ALP SERPL-CCNC: 81 U/L (ref 39–117)
ALT SERPL-CCNC: 93 U/L (ref 1–41)
AST SERPL-CCNC: 102 U/L (ref 1–40)
BASOPHILS # BLD AUTO: 0.05 10*3/MM3 (ref 0–0.2)
BASOPHILS NFR BLD AUTO: 0.6 % (ref 0–1.5)
BILIRUB SERPL-MCNC: 0.8 MG/DL (ref 0–1.2)
BUN SERPL-MCNC: 13 MG/DL (ref 6–20)
BUN/CREAT SERPL: 16 (ref 7–25)
CALCIUM SERPL-MCNC: 9.9 MG/DL (ref 8.6–10.5)
CHLORIDE SERPL-SCNC: 96 MMOL/L (ref 98–107)
CO2 SERPL-SCNC: 27.2 MMOL/L (ref 22–29)
CREAT SERPL-MCNC: 0.81 MG/DL (ref 0.76–1.27)
CRP SERPL-MCNC: 2.13 MG/DL (ref 0–0.5)
EOSINOPHIL # BLD AUTO: 0.13 10*3/MM3 (ref 0–0.4)
EOSINOPHIL NFR BLD AUTO: 1.6 % (ref 0.3–6.2)
ERYTHROCYTE [DISTWIDTH] IN BLOOD BY AUTOMATED COUNT: 13.1 % (ref 12.3–15.4)
GLOBULIN SER CALC-MCNC: 2.2 GM/DL
GLUCOSE SERPL-MCNC: 100 MG/DL (ref 65–99)
HCT VFR BLD AUTO: 45.9 % (ref 37.5–51)
HGB BLD-MCNC: 15.7 G/DL (ref 13–17.7)
IMM GRANULOCYTES # BLD AUTO: 0.06 10*3/MM3 (ref 0–0.05)
IMM GRANULOCYTES NFR BLD AUTO: 0.7 % (ref 0–0.5)
LIPASE SERPL-CCNC: 27 U/L (ref 13–60)
LYMPHOCYTES # BLD AUTO: 1.85 10*3/MM3 (ref 0.7–3.1)
LYMPHOCYTES NFR BLD AUTO: 22.8 % (ref 19.6–45.3)
MCH RBC QN AUTO: 30.5 PG (ref 26.6–33)
MCHC RBC AUTO-ENTMCNC: 34.2 G/DL (ref 31.5–35.7)
MCV RBC AUTO: 89.1 FL (ref 79–97)
MONOCYTES # BLD AUTO: 0.75 10*3/MM3 (ref 0.1–0.9)
MONOCYTES NFR BLD AUTO: 9.2 % (ref 5–12)
NEUTROPHILS # BLD AUTO: 5.27 10*3/MM3 (ref 1.7–7)
NEUTROPHILS NFR BLD AUTO: 65.1 % (ref 42.7–76)
NRBC BLD AUTO-RTO: 0 /100 WBC (ref 0–0.2)
PLATELET # BLD AUTO: 291 10*3/MM3 (ref 140–450)
POTASSIUM SERPL-SCNC: 4.6 MMOL/L (ref 3.5–5.2)
PROT SERPL-MCNC: 7.1 G/DL (ref 6–8.5)
RBC # BLD AUTO: 5.15 10*6/MM3 (ref 4.14–5.8)
SODIUM SERPL-SCNC: 136 MMOL/L (ref 136–145)
WBC # BLD AUTO: 8.11 10*3/MM3 (ref 3.4–10.8)

## 2020-08-24 NOTE — PROGRESS NOTES
"Subjective   Jeancarlos Zheng is a 39 y.o. male.     Patient is here for complete physical exam.  He is trying to eat better and lose weight  Complains of chronic abdominal pain chronic dyspepsia frequently after meals it can hurt upper mid or lower generally all over and associated with some nausea sense of fullness abdominal pain cramping and diarrhea  Frequently loose stools, could not tell me if it is upper or lower just all over but definitely including lower abdomen.  No unexplained weight loss night sweats no fevers no chills he is not had a colonoscopy.  Gallbladder ultrasound several years ago was normal  He is trying to eat better poor diet but improving his diet more recently no tobacco abuse no drug or alcohol abuse  No recent trips no recent travel no acute pain presently  Abdominal complaints better if he watches his diet more closely avoid spicy greasy fatty foods  No family history of ulcerative colitis    Chronic anxiety  Sees psychiatry every 3 months no history of severe mental illness he takes a tiny dose of BuSpar generally once a day would like me to fill this for now 1 never any hospitalizations no serious mental illness no history of bipolar disease or schizophrenia no strong family history    He feels like this medication helps him much and does not does not want to change it    Chronic PPI for chronic reflux stable    Past medical history healthy           /82   Pulse 81   Temp 96.3 °F (35.7 °C) (Temporal)   Ht 185.4 cm (73\")   Wt 121 kg (266 lb)   SpO2 97%   BMI 35.09 kg/m²       The following portions of the patient's history were reviewed and updated as appropriate: allergies, current medications, past family history, past medical history, past social history, past surgical history and problem list.    Review of Systems   Constitutional: Negative for activity change, appetite change, chills, diaphoresis, fatigue, fever and unexpected weight loss.   HENT: Negative.  Negative for " trouble swallowing.    Eyes: Negative.    Respiratory: Negative.  Negative for cough and shortness of breath.    Cardiovascular: Negative for chest pain, palpitations and leg swelling.   Gastrointestinal: Positive for diarrhea, nausea, GERD and indigestion. Negative for abdominal pain, blood in stool and vomiting.   Genitourinary: Negative.    Musculoskeletal: Negative.    Skin: Negative.    Neurological: Negative.  Negative for dizziness.   Psychiatric/Behavioral: Negative.        Objective   Physical Exam   Constitutional: He is oriented to person, place, and time. He appears well-developed and well-nourished. No distress.   HENT:   Head: Normocephalic and atraumatic.   Nose: Nose normal.   Mouth/Throat: Oropharynx is clear and moist.   Eyes: Pupils are equal, round, and reactive to light. Conjunctivae are normal.   Neck: Neck supple. No JVD present.   Cardiovascular: Normal rate, regular rhythm and normal heart sounds.   No murmur heard.  Pulmonary/Chest: Effort normal and breath sounds normal. No respiratory distress. He has no wheezes.   Abdominal: Soft. Bowel sounds are normal. He exhibits no distension and no mass. There is no tenderness. There is no guarding. No hernia.   Genitourinary:   Genitourinary Comments: Testicular exam normal no nodules no masses   Musculoskeletal: He exhibits no edema or tenderness.   Lymphadenopathy:     He has no cervical adenopathy.   Neurological: He is alert and oriented to person, place, and time.   Skin: Skin is warm and dry. He is not diaphoretic.   Psychiatric: He has a normal mood and affect. His behavior is normal. Judgment and thought content normal.   Vitals reviewed.        Assessment/Plan   Jeancarlos was seen today for annual exam.    Diagnoses and all orders for this visit:    Health maintenance examination  -     CBC & Differential  -     Comprehensive Metabolic Panel  -     C-reactive Protein  -     Lipase    Abdominal cramping  -     CBC & Differential  -      Comprehensive Metabolic Panel  -     C-reactive Protein  -     Lipase  -     Ambulatory Referral to Gastroenterology    Loose stools  -     CBC & Differential  -     Comprehensive Metabolic Panel  -     C-reactive Protein  -     Lipase  -     Ambulatory Referral to Gastroenterology    Morbid (severe) obesity due to excess calories (CMS/HCC)  -     CBC & Differential  -     Comprehensive Metabolic Panel  -     C-reactive Protein  -     Lipase    Other orders  -     busPIRone (BUSPAR) 10 MG tablet; Take 1 tablet by mouth Daily.  -     omeprazole (priLOSEC) 40 MG capsule; Take 1 capsule by mouth Daily.      Risk-benefit PPI generally l should avoid chronic use  Therapeutic lifestyle changes gradual weight loss instead  He will need a work-up with gastro likely colonoscopy meantime dietary changes check some labs any severe complaints vomiting weakness fever emergency room      Continue BuSpar anxiety continue healthy diet  Meditation relaxation cognitive behavioral strategies      Patient Instructions   Discharge instructions    Smaller more frequent meals greatly decrease sugary foods fatty foods spicy foods onions etc.  Consider food diary to see what bothers you more than other things you need a work-up including an EGD and colonoscopy likely and possibly a HIDA scan to further evaluate your gallbladder  Your gastroenterologist will work with you on this and make sure you have good follow-ups you likely need at least an extended follow-up    Encourage you weight loss over the next year 1500 rox-maximum 2000/day mostly vegetables chicken fish    Consider  fodmod diet was tries to eliminate gassy foods   And slowly reintroduce those    Severe abdominal pain weakness fever emergency room  Follow-up 6 to 12 months yearly annual physical

## 2021-02-17 RX ORDER — OMEPRAZOLE 40 MG/1
40 CAPSULE, DELAYED RELEASE ORAL DAILY
Qty: 90 CAPSULE | Refills: 1 | Status: SHIPPED | OUTPATIENT
Start: 2021-02-17 | End: 2021-08-17 | Stop reason: SDUPTHER

## 2021-02-17 RX ORDER — BUSPIRONE HYDROCHLORIDE 10 MG/1
10 TABLET ORAL DAILY
Qty: 90 TABLET | Refills: 1 | Status: SHIPPED | OUTPATIENT
Start: 2021-02-17 | End: 2021-08-17

## 2021-02-17 NOTE — TELEPHONE ENCOUNTER
Caller: Jeancarlos Zheng    Relationship: Self    Best call back number: [phone]    Medication needed:   Requested Prescriptions     Pending Prescriptions Disp Refills   • busPIRone (BUSPAR) 10 MG tablet 90 tablet 1     Sig: Take 1 tablet by mouth Daily.   • omeprazole (priLOSEC) 40 MG capsule 90 capsule 1     Sig: Take 1 capsule by mouth Daily.       When do you need the refill by: 2/18/2021    What details did the patient provide when requesting the medication: Has about 3 days left.    Does the patient have less than a 3 day supply:  [x] Yes  [] No    What is the patient's preferred pharmacy: [id number] - [place], [place] - [address] [facility] [address]  - [phone] [facility] [phone] FX

## 2021-04-02 ENCOUNTER — BULK ORDERING (OUTPATIENT)
Dept: CASE MANAGEMENT | Facility: OTHER | Age: 41
End: 2021-04-02

## 2021-04-02 DIAGNOSIS — Z23 IMMUNIZATION DUE: ICD-10-CM

## 2021-08-17 RX ORDER — OMEPRAZOLE 40 MG/1
40 CAPSULE, DELAYED RELEASE ORAL DAILY
Qty: 90 CAPSULE | Refills: 1 | Status: SHIPPED | OUTPATIENT
Start: 2021-08-17 | End: 2022-02-17

## 2021-08-17 RX ORDER — BUSPIRONE HYDROCHLORIDE 10 MG/1
10 TABLET ORAL DAILY
Qty: 90 TABLET | Refills: 0 | Status: SHIPPED | OUTPATIENT
Start: 2021-08-17 | End: 2021-09-08 | Stop reason: SDUPTHER

## 2021-08-17 NOTE — TELEPHONE ENCOUNTER
Rx Refill Note  Requested Prescriptions     Pending Prescriptions Disp Refills   • busPIRone (BUSPAR) 10 MG tablet [Pharmacy Med Name: BUSPIRONE 10MG TABLETS] 90 tablet 1     Sig: TAKE 1 TABLET BY MOUTH DAILY      Last office visit with prescribing clinician: 8/20/2020      Next office visit with prescribing clinician: 8/26/2021            Marlena Boyle LPN  08/17/21, 13:55 EDT

## 2021-09-08 ENCOUNTER — OFFICE VISIT (OUTPATIENT)
Dept: FAMILY MEDICINE CLINIC | Facility: CLINIC | Age: 41
End: 2021-09-08

## 2021-09-08 VITALS
WEIGHT: 262.8 LBS | OXYGEN SATURATION: 98 % | HEIGHT: 73 IN | TEMPERATURE: 97.8 F | BODY MASS INDEX: 34.83 KG/M2 | RESPIRATION RATE: 16 BRPM | DIASTOLIC BLOOD PRESSURE: 84 MMHG | SYSTOLIC BLOOD PRESSURE: 129 MMHG | HEART RATE: 75 BPM

## 2021-09-08 DIAGNOSIS — F41.9 ANXIETY: ICD-10-CM

## 2021-09-08 DIAGNOSIS — E66.01 CLASS 2 SEVERE OBESITY DUE TO EXCESS CALORIES WITH SERIOUS COMORBIDITY AND BODY MASS INDEX (BMI) OF 35.0 TO 35.9 IN ADULT (HCC): ICD-10-CM

## 2021-09-08 DIAGNOSIS — Z00.00 HEALTH MAINTENANCE EXAMINATION: Primary | ICD-10-CM

## 2021-09-08 DIAGNOSIS — R79.89 ELEVATED LIVER FUNCTION TESTS: ICD-10-CM

## 2021-09-08 DIAGNOSIS — Z00.00 HEALTH MAINTENANCE EXAMINATION: ICD-10-CM

## 2021-09-08 PROCEDURE — 93000 ELECTROCARDIOGRAM COMPLETE: CPT | Performed by: NURSE PRACTITIONER

## 2021-09-08 PROCEDURE — 99396 PREV VISIT EST AGE 40-64: CPT | Performed by: NURSE PRACTITIONER

## 2021-09-08 RX ORDER — BUSPIRONE HYDROCHLORIDE 10 MG/1
10 TABLET ORAL DAILY
Qty: 90 TABLET | Refills: 3 | Status: SHIPPED | OUTPATIENT
Start: 2021-09-08 | End: 2021-11-22

## 2021-09-08 NOTE — PATIENT INSTRUCTIONS
Discharge instructions      Focus more on dietary changes  We do not require a lot of calories and we need a lot of fiber to protect us from excessive calories and sugars etc.  Much more fiber  64 ounces of water daily  Great will reduce breads Posta sweets  Avoid all fast food  Greatly decrease or quit alcohol  Focus on steady changes over the next 18 months or so    Return office fasting labs  His lungs are doing well yearly physical and fasting labs prior to next years physical

## 2021-09-08 NOTE — PROGRESS NOTES
"Chief Complaint  Annual Exam (PHYSICAL)    Subjective          Jeancarlos Zheng presents to [facility]  Pleasant patient here today for complete physical exam,  He has been doing well overall he has some GI issues a year or 2 ago a lot of cramping and gas but he has changed his diet, and actually added some dairy and some cheese and is doing well and his symptoms have resolved he still takes omeprazole for acid reflux but this is controlled as well  He is active, he gets exercise in particular yard work, he does eat out a little bit as well as drinks several drinks a day responsively is a history of mild fatty liver he had ultrasound of his gallbladder 2018,  He is not seeing gastro  We simply need to repeat his test and get him scheduled for an ultrasound should his liver function test to be elevated.          Objective   Vital Signs:   /84   Pulse 75   Temp 97.8 °F (36.6 °C) (Infrared)   Resp 16   Ht 185.4 cm (73\")   Wt 119 kg (262 lb 12.8 oz)   SpO2 98%   BMI 34.67 kg/m²     Physical Exam  Vitals reviewed.   Constitutional:       Appearance: He is well-developed.   HENT:      Head: Normocephalic.      Nose: Nose normal.   Eyes:      General: No scleral icterus.     Conjunctiva/sclera: Conjunctivae normal.      Pupils: Pupils are equal, round, and reactive to light.   Neck:      Thyroid: No thyromegaly.      Vascular: No JVD.   Cardiovascular:      Rate and Rhythm: Normal rate and regular rhythm.      Heart sounds: Normal heart sounds. No murmur heard.   No friction rub. No gallop.    Pulmonary:      Effort: Pulmonary effort is normal. No respiratory distress.      Breath sounds: Normal breath sounds. No stridor. No wheezing or rales.   Abdominal:      General: Bowel sounds are normal. There is no distension.      Palpations: Abdomen is soft.      Tenderness: There is no abdominal tenderness.      Comments: No hepatosplenomegaly, no ascites,   Musculoskeletal:         " General: No tenderness.      Cervical back: Neck supple.   Lymphadenopathy:      Cervical: No cervical adenopathy.   Skin:     General: Skin is warm and dry.      Findings: No erythema or rash.   Neurological:      Mental Status: He is alert and oriented to person, place, and time.      Deep Tendon Reflexes: Reflexes are normal and symmetric.   Psychiatric:         Behavior: Behavior normal.         Thought Content: Thought content normal.         Judgment: Judgment normal.        Result Review :                 Assessment and Plan    Diagnoses and all orders for this visit:    1. Health maintenance examination (Primary)  -     Cancel: CBC & Differential; Future  -     Cancel: Comprehensive Metabolic Panel; Future  -     Cancel: Lipid Panel With LDL / HDL Ratio; Future  -     Cancel: TSH Rfx On Abnormal To Free T4; Future  -     Cancel: Urinalysis With Microscopic If Indicated (No Culture) - Urine, Clean Catch; Future  -     CBC & Differential; Future  -     Comprehensive Metabolic Panel; Future  -     Lipid Panel With LDL / HDL Ratio; Future  -     TSH Rfx On Abnormal To Free T4; Future  -     Urinalysis With Microscopic If Indicated (No Culture) - Urine, Clean Catch; Future  -     Ferritin; Future  -     Hepatitis C antibody; Future  -     ECG 12 Lead    2. Elevated liver function tests  -     Cancel: CBC & Differential; Future  -     Cancel: Comprehensive Metabolic Panel; Future  -     Cancel: Lipid Panel With LDL / HDL Ratio; Future  -     Cancel: TSH Rfx On Abnormal To Free T4; Future  -     Cancel: Urinalysis With Microscopic If Indicated (No Culture) - Urine, Clean Catch; Future  -     CBC & Differential; Future  -     Comprehensive Metabolic Panel; Future  -     Lipid Panel With LDL / HDL Ratio; Future  -     TSH Rfx On Abnormal To Free T4; Future  -     Urinalysis With Microscopic If Indicated (No Culture) - Urine, Clean Catch; Future  -     Ferritin; Future  -     Hepatitis C antibody; Future    3.  Anxiety  -     CBC & Differential; Future  -     Comprehensive Metabolic Panel; Future  -     Lipid Panel With LDL / HDL Ratio; Future  -     TSH Rfx On Abnormal To Free T4; Future  -     Urinalysis With Microscopic If Indicated (No Culture) - Urine, Clean Catch; Future  -     Ferritin; Future  -     Hepatitis C antibody; Future    4. Class 2 severe obesity due to excess calories with serious comorbidity and body mass index (BMI) of 35.0 to 35.9 in adult (CMS/HCC)  -     ECG 12 Lead    Other orders  -     busPIRone (BUSPAR) 10 MG tablet; Take 1 tablet by mouth Daily.  Dispense: 90 tablet; Refill: 3        Follow Up   Return for Annual physical.  Patient was given instructions and counseling regarding his condition or for health maintenance advice. Please see specific information pulled into the AVS if appropriate.     Discharge instructions      Focus more on dietary changes  We do not require a lot of calories and we need a lot of fiber to protect us from excessive calories and sugars etc.  Much more fiber  64 ounces of water daily  Great will reduce breads Posta sweets  Avoid all fast food  Greatly decrease or quit alcohol  Focus on steady changes over the next 18 months or so    Return office fasting labs  His lungs are doing well yearly physical and fasting labs prior to next years physical

## 2021-09-15 NOTE — PROGRESS NOTES
Procedure   Procedures     Adult ECG Report     Name: Jeancarlos Zheng   Age: 41 y.o.   Gender: male       Rate: 63   Rhythm: normal sinus rhythm   QRS Axis: nml   LA Interval: 175   QRS Duration: 118   QTc: 413   Voltages: .   Conduction Disturbances: Moderate purulence widening 118 ms without block, nonspecific T wave abnormality   Other Abnormalities: none     Narrative Interpretation: Abnormal EKG normal sinus rhythm as above patient is without chest complaints indication health maintenance obesity no prior EKG available for comparison

## 2021-09-16 LAB
ALBUMIN SERPL-MCNC: 4.7 G/DL (ref 3.5–5.2)
ALBUMIN/GLOB SERPL: 2.2 G/DL
ALP SERPL-CCNC: 74 U/L (ref 39–117)
ALT SERPL-CCNC: 43 U/L (ref 1–41)
APPEARANCE UR: CLEAR
AST SERPL-CCNC: 35 U/L (ref 1–40)
BASOPHILS # BLD AUTO: 0.02 10*3/MM3 (ref 0–0.2)
BASOPHILS NFR BLD AUTO: 0.3 % (ref 0–1.5)
BILIRUB SERPL-MCNC: 0.7 MG/DL (ref 0–1.2)
BILIRUB UR QL STRIP: NEGATIVE
BUN SERPL-MCNC: 14 MG/DL (ref 6–20)
BUN/CREAT SERPL: 18.4 (ref 7–25)
CALCIUM SERPL-MCNC: 9.8 MG/DL (ref 8.6–10.5)
CHLORIDE SERPL-SCNC: 99 MMOL/L (ref 98–107)
CHOLEST SERPL-MCNC: 165 MG/DL (ref 0–200)
CO2 SERPL-SCNC: 27.7 MMOL/L (ref 22–29)
COLOR UR: YELLOW
CREAT SERPL-MCNC: 0.76 MG/DL (ref 0.76–1.27)
EOSINOPHIL # BLD AUTO: 0.2 10*3/MM3 (ref 0–0.4)
EOSINOPHIL NFR BLD AUTO: 2.7 % (ref 0.3–6.2)
ERYTHROCYTE [DISTWIDTH] IN BLOOD BY AUTOMATED COUNT: 13 % (ref 12.3–15.4)
FERRITIN SERPL-MCNC: 340 NG/ML (ref 30–400)
GLOBULIN SER CALC-MCNC: 2.1 GM/DL
GLUCOSE SERPL-MCNC: 140 MG/DL (ref 65–99)
GLUCOSE UR QL: NEGATIVE
HCT VFR BLD AUTO: 45 % (ref 37.5–51)
HCV AB S/CO SERPL IA: <0.1 S/CO RATIO (ref 0–0.9)
HDLC SERPL-MCNC: 49 MG/DL (ref 40–60)
HGB BLD-MCNC: 15.2 G/DL (ref 13–17.7)
HGB UR QL STRIP: NEGATIVE
IMM GRANULOCYTES # BLD AUTO: 0.06 10*3/MM3 (ref 0–0.05)
IMM GRANULOCYTES NFR BLD AUTO: 0.8 % (ref 0–0.5)
KETONES UR QL STRIP: NEGATIVE
LDLC SERPL CALC-MCNC: 92 MG/DL (ref 0–100)
LDLC/HDLC SERPL: 1.81 {RATIO}
LEUKOCYTE ESTERASE UR QL STRIP: NEGATIVE
LYMPHOCYTES # BLD AUTO: 1.79 10*3/MM3 (ref 0.7–3.1)
LYMPHOCYTES NFR BLD AUTO: 23.9 % (ref 19.6–45.3)
MCH RBC QN AUTO: 30.6 PG (ref 26.6–33)
MCHC RBC AUTO-ENTMCNC: 33.8 G/DL (ref 31.5–35.7)
MCV RBC AUTO: 90.7 FL (ref 79–97)
MONOCYTES # BLD AUTO: 0.71 10*3/MM3 (ref 0.1–0.9)
MONOCYTES NFR BLD AUTO: 9.5 % (ref 5–12)
NEUTROPHILS # BLD AUTO: 4.71 10*3/MM3 (ref 1.7–7)
NEUTROPHILS NFR BLD AUTO: 62.8 % (ref 42.7–76)
NITRITE UR QL STRIP: NEGATIVE
NRBC BLD AUTO-RTO: 0 /100 WBC (ref 0–0.2)
PH UR STRIP: 7.5 [PH] (ref 5–8)
PLATELET # BLD AUTO: 294 10*3/MM3 (ref 140–450)
POTASSIUM SERPL-SCNC: 4.5 MMOL/L (ref 3.5–5.2)
PROT SERPL-MCNC: 6.8 G/DL (ref 6–8.5)
PROT UR QL STRIP: ABNORMAL
RBC # BLD AUTO: 4.96 10*6/MM3 (ref 4.14–5.8)
SODIUM SERPL-SCNC: 138 MMOL/L (ref 136–145)
SP GR UR: 1.02 (ref 1–1.03)
TRIGL SERPL-MCNC: 137 MG/DL (ref 0–150)
TSH SERPL DL<=0.005 MIU/L-ACNC: 1.81 UIU/ML (ref 0.27–4.2)
UROBILINOGEN UR STRIP-MCNC: ABNORMAL MG/DL
VLDLC SERPL CALC-MCNC: 24 MG/DL (ref 5–40)
WBC # BLD AUTO: 7.49 10*3/MM3 (ref 3.4–10.8)

## 2021-11-22 RX ORDER — BUSPIRONE HYDROCHLORIDE 10 MG/1
10 TABLET ORAL DAILY
Qty: 90 TABLET | Refills: 1 | Status: SHIPPED | OUTPATIENT
Start: 2021-11-22 | End: 2022-12-09 | Stop reason: SDUPTHER

## 2021-11-22 NOTE — TELEPHONE ENCOUNTER
Rx Refill Note  Requested Prescriptions     Pending Prescriptions Disp Refills   • busPIRone (BUSPAR) 10 MG tablet [Pharmacy Med Name: BUSPIRONE 10MG TABLETS] 90 tablet 3     Sig: TAKE 1 TABLET BY MOUTH DAILY      Last office visit with prescribing clinician: 9/8/2021      Next office visit with prescribing clinician: Visit date not found            Red Huddleston Rep  11/22/21, 08:31 EST

## 2022-02-17 RX ORDER — OMEPRAZOLE 40 MG/1
40 CAPSULE, DELAYED RELEASE ORAL DAILY
Qty: 90 CAPSULE | Refills: 1 | Status: SHIPPED | OUTPATIENT
Start: 2022-02-17 | End: 2022-09-08 | Stop reason: SDUPTHER

## 2022-02-17 NOTE — TELEPHONE ENCOUNTER
Rx Refill Note  Requested Prescriptions     Pending Prescriptions Disp Refills   • omeprazole (priLOSEC) 40 MG capsule [Pharmacy Med Name: OMEPRAZOLE 40MG CAPSULES] 90 capsule 1     Sig: TAKE 1 CAPSULE BY MOUTH DAILY      Last office visit with prescribing clinician: 9/8/2021      Next office visit with prescribing clinician: Visit date not found            Licha Naylor MA  02/17/22, 12:57 EST

## 2022-09-08 RX ORDER — OMEPRAZOLE 40 MG/1
40 CAPSULE, DELAYED RELEASE ORAL DAILY
Qty: 90 CAPSULE | Refills: 3 | Status: SHIPPED | OUTPATIENT
Start: 2022-09-08

## 2022-09-08 NOTE — TELEPHONE ENCOUNTER
Caller: Jeancarlos Zheng    Relationship: Self    Best call back number: [phone]    Requested Prescriptions:   Requested Prescriptions     Pending Prescriptions Disp Refills   • omeprazole (priLOSEC) 40 MG capsule 90 capsule 1     Sig: Take 1 capsule by mouth Daily.        Pharmacy where request should be sent: [id number] - [place], [place] - [address] [address] AT [facility] [address] [address] - [phone]  - [phone] FX     Additional details provided by patient: PATIENT IS OUT OF THIS MEDICATION     Does the patient have less than a 3 day supply:  [x] Yes  [] No    Red Jackson Rep   09/08/22 10:56 EDT

## 2022-10-28 ENCOUNTER — OFFICE VISIT (OUTPATIENT)
Dept: GASTROENTEROLOGY | Facility: CLINIC | Age: 42
End: 2022-10-28

## 2022-10-28 ENCOUNTER — PREP FOR SURGERY (OUTPATIENT)
Dept: SURGERY | Facility: SURGERY CENTER | Age: 42
End: 2022-10-28

## 2022-10-28 ENCOUNTER — LAB (OUTPATIENT)
Dept: LAB | Facility: HOSPITAL | Age: 42
End: 2022-10-28

## 2022-10-28 VITALS
BODY MASS INDEX: 36.57 KG/M2 | HEART RATE: 84 BPM | DIASTOLIC BLOOD PRESSURE: 84 MMHG | TEMPERATURE: 97.6 F | WEIGHT: 277.2 LBS | SYSTOLIC BLOOD PRESSURE: 133 MMHG | OXYGEN SATURATION: 97 %

## 2022-10-28 DIAGNOSIS — Z80.0 FAMILY HISTORY OF COLON CANCER: ICD-10-CM

## 2022-10-28 DIAGNOSIS — R11.0 NAUSEA: ICD-10-CM

## 2022-10-28 DIAGNOSIS — R14.0 BLOATING: Primary | ICD-10-CM

## 2022-10-28 DIAGNOSIS — K21.9 GASTROESOPHAGEAL REFLUX DISEASE, UNSPECIFIED WHETHER ESOPHAGITIS PRESENT: ICD-10-CM

## 2022-10-28 DIAGNOSIS — Z12.11 ENCOUNTER FOR SCREENING FOR MALIGNANT NEOPLASM OF COLON: ICD-10-CM

## 2022-10-28 DIAGNOSIS — R68.81 EARLY SATIETY: ICD-10-CM

## 2022-10-28 LAB
ALBUMIN SERPL-MCNC: 4.1 G/DL (ref 3.5–5.2)
ALBUMIN/GLOB SERPL: 1.5 G/DL
ALP SERPL-CCNC: 84 U/L (ref 39–117)
ALT SERPL W P-5'-P-CCNC: 78 U/L (ref 1–41)
ANION GAP SERPL CALCULATED.3IONS-SCNC: 10 MMOL/L (ref 5–15)
AST SERPL-CCNC: 72 U/L (ref 1–40)
BASOPHILS # BLD AUTO: 0.04 10*3/MM3 (ref 0–0.2)
BASOPHILS NFR BLD AUTO: 0.5 % (ref 0–1.5)
BILIRUB SERPL-MCNC: 0.5 MG/DL (ref 0–1.2)
BUN SERPL-MCNC: 13 MG/DL (ref 6–20)
BUN/CREAT SERPL: 16.7 (ref 7–25)
CALCIUM SPEC-SCNC: 9.5 MG/DL (ref 8.6–10.5)
CHLORIDE SERPL-SCNC: 102 MMOL/L (ref 98–107)
CO2 SERPL-SCNC: 27 MMOL/L (ref 22–29)
CREAT SERPL-MCNC: 0.78 MG/DL (ref 0.76–1.27)
DEPRECATED RDW RBC AUTO: 42.7 FL (ref 37–54)
EGFRCR SERPLBLD CKD-EPI 2021: 114.2 ML/MIN/1.73
EOSINOPHIL # BLD AUTO: 0.18 10*3/MM3 (ref 0–0.4)
EOSINOPHIL NFR BLD AUTO: 2.2 % (ref 0.3–6.2)
ERYTHROCYTE [DISTWIDTH] IN BLOOD BY AUTOMATED COUNT: 12.9 % (ref 12.3–15.4)
GLOBULIN UR ELPH-MCNC: 2.8 GM/DL
GLUCOSE SERPL-MCNC: 235 MG/DL (ref 65–99)
HCT VFR BLD AUTO: 42.9 % (ref 37.5–51)
HGB BLD-MCNC: 14.2 G/DL (ref 13–17.7)
IMM GRANULOCYTES # BLD AUTO: 0.07 10*3/MM3 (ref 0–0.05)
IMM GRANULOCYTES NFR BLD AUTO: 0.9 % (ref 0–0.5)
LYMPHOCYTES # BLD AUTO: 2 10*3/MM3 (ref 0.7–3.1)
LYMPHOCYTES NFR BLD AUTO: 24.3 % (ref 19.6–45.3)
MCH RBC QN AUTO: 30.3 PG (ref 26.6–33)
MCHC RBC AUTO-ENTMCNC: 33.1 G/DL (ref 31.5–35.7)
MCV RBC AUTO: 91.5 FL (ref 79–97)
MONOCYTES # BLD AUTO: 0.72 10*3/MM3 (ref 0.1–0.9)
MONOCYTES NFR BLD AUTO: 8.7 % (ref 5–12)
NEUTROPHILS NFR BLD AUTO: 5.22 10*3/MM3 (ref 1.7–7)
NEUTROPHILS NFR BLD AUTO: 63.4 % (ref 42.7–76)
NRBC BLD AUTO-RTO: 0 /100 WBC (ref 0–0.2)
PLATELET # BLD AUTO: 244 10*3/MM3 (ref 140–450)
PMV BLD AUTO: 9.8 FL (ref 6–12)
POTASSIUM SERPL-SCNC: 3.8 MMOL/L (ref 3.5–5.2)
PROT SERPL-MCNC: 6.9 G/DL (ref 6–8.5)
RBC # BLD AUTO: 4.69 10*6/MM3 (ref 4.14–5.8)
SODIUM SERPL-SCNC: 139 MMOL/L (ref 136–145)
WBC NRBC COR # BLD: 8.23 10*3/MM3 (ref 3.4–10.8)

## 2022-10-28 PROCEDURE — 86231 EMA EACH IG CLASS: CPT | Performed by: NURSE PRACTITIONER

## 2022-10-28 PROCEDURE — 82784 ASSAY IGA/IGD/IGG/IGM EACH: CPT | Performed by: NURSE PRACTITIONER

## 2022-10-28 PROCEDURE — 36415 COLL VENOUS BLD VENIPUNCTURE: CPT | Performed by: NURSE PRACTITIONER

## 2022-10-28 PROCEDURE — 99214 OFFICE O/P EST MOD 30 MIN: CPT | Performed by: NURSE PRACTITIONER

## 2022-10-28 PROCEDURE — 80053 COMPREHEN METABOLIC PANEL: CPT | Performed by: NURSE PRACTITIONER

## 2022-10-28 PROCEDURE — 86364 TISS TRNSGLTMNASE EA IG CLAS: CPT | Performed by: NURSE PRACTITIONER

## 2022-10-28 PROCEDURE — 85025 COMPLETE CBC W/AUTO DIFF WBC: CPT | Performed by: NURSE PRACTITIONER

## 2022-10-28 RX ORDER — SODIUM CHLORIDE 0.9 % (FLUSH) 0.9 %
10 SYRINGE (ML) INJECTION AS NEEDED
Status: CANCELLED | OUTPATIENT
Start: 2022-10-28

## 2022-10-28 RX ORDER — SODIUM CHLORIDE 0.9 % (FLUSH) 0.9 %
3 SYRINGE (ML) INJECTION EVERY 12 HOURS SCHEDULED
Status: CANCELLED | OUTPATIENT
Start: 2022-10-28

## 2022-10-28 RX ORDER — SODIUM CHLORIDE, SODIUM LACTATE, POTASSIUM CHLORIDE, CALCIUM CHLORIDE 600; 310; 30; 20 MG/100ML; MG/100ML; MG/100ML; MG/100ML
30 INJECTION, SOLUTION INTRAVENOUS CONTINUOUS PRN
Status: CANCELLED | OUTPATIENT
Start: 2022-10-28

## 2022-10-28 NOTE — PATIENT INSTRUCTIONS
Schedule CT scan for further evaluation of symptoms.    Schedule EGD for further evaluation of symptoms.     Schedule colonoscopy for screening.

## 2022-10-31 DIAGNOSIS — R74.8 ELEVATED LIVER ENZYMES: Primary | ICD-10-CM

## 2022-10-31 LAB
ENDOMYSIUM IGA SER QL: NEGATIVE
IGA SERPL-MCNC: 384 MG/DL (ref 90–386)
TTG IGA SER-ACNC: <2 U/ML (ref 0–3)

## 2022-12-09 RX ORDER — BUSPIRONE HYDROCHLORIDE 10 MG/1
10 TABLET ORAL DAILY
Qty: 90 TABLET | Refills: 1 | Status: SHIPPED | OUTPATIENT
Start: 2022-12-09

## 2022-12-09 NOTE — TELEPHONE ENCOUNTER
Caller: Jeancarlos Zheng MATI    Relationship: Self    Best call back number: [phone]     Requested Prescriptions:   Requested Prescriptions     Pending Prescriptions Disp Refills   • busPIRone (BUSPAR) 10 MG tablet 90 tablet 1     Sig: Take 1 tablet by mouth Daily.        Pharmacy where request should be sent: [facility] #[id number] - [place], [place] - [address] [facility] [address]  - [phone]  - [phone] FX     Does the patient have less than a 3 day supply:  [x] Yes  [] No    Would you like a call back once the refill request has been completed: [x] Yes [] No    If the office needs to give you a call back, can they leave a voicemail: [x] Yes [] No    Red Lopez Rep   12/09/22 16:06 EST

## 2022-12-22 ENCOUNTER — TELEPHONE (OUTPATIENT)
Dept: GASTROENTEROLOGY | Facility: CLINIC | Age: 42
End: 2022-12-22

## 2023-01-06 ENCOUNTER — LAB REQUISITION (OUTPATIENT)
Dept: LAB | Facility: HOSPITAL | Age: 43
End: 2023-01-06
Payer: COMMERCIAL

## 2023-01-06 ENCOUNTER — OUTSIDE FACILITY SERVICE (OUTPATIENT)
Dept: GASTROENTEROLOGY | Facility: CLINIC | Age: 43
End: 2023-01-06
Payer: COMMERCIAL

## 2023-01-06 DIAGNOSIS — R19.7 DIARRHEA, UNSPECIFIED TYPE: ICD-10-CM

## 2023-01-06 PROCEDURE — 45380 COLONOSCOPY AND BIOPSY: CPT | Performed by: INTERNAL MEDICINE

## 2023-01-06 PROCEDURE — 88342 IMHCHEM/IMCYTCHM 1ST ANTB: CPT | Performed by: INTERNAL MEDICINE

## 2023-01-06 PROCEDURE — 43239 EGD BIOPSY SINGLE/MULTIPLE: CPT | Performed by: INTERNAL MEDICINE

## 2023-01-06 PROCEDURE — 45385 COLONOSCOPY W/LESION REMOVAL: CPT | Performed by: INTERNAL MEDICINE

## 2023-01-06 PROCEDURE — 88341 IMHCHEM/IMCYTCHM EA ADD ANTB: CPT | Performed by: INTERNAL MEDICINE

## 2023-01-06 PROCEDURE — 88305 TISSUE EXAM BY PATHOLOGIST: CPT | Performed by: INTERNAL MEDICINE

## 2023-01-06 PROCEDURE — 88312 SPECIAL STAINS GROUP 1: CPT | Performed by: INTERNAL MEDICINE

## 2023-01-10 LAB
LAB AP CASE REPORT: NORMAL
LAB AP DIAGNOSIS COMMENT: NORMAL
PATH REPORT.FINAL DX SPEC: NORMAL
PATH REPORT.GROSS SPEC: NORMAL

## 2023-01-12 DIAGNOSIS — A04.8 BACTERIAL INFECTION DUE TO HELICOBACTER PYLORI: Primary | ICD-10-CM

## 2023-01-12 DIAGNOSIS — K52.839 MICROSCOPIC COLITIS, UNSPECIFIED MICROSCOPIC COLITIS TYPE: ICD-10-CM

## 2023-01-12 RX ORDER — TETRACYCLINE HYDROCHLORIDE 500 MG/1
500 CAPSULE ORAL 4 TIMES DAILY
Qty: 56 CAPSULE | Refills: 0 | Status: SHIPPED | OUTPATIENT
Start: 2023-01-12 | End: 2023-01-16 | Stop reason: SDUPTHER

## 2023-01-12 RX ORDER — BISMUTH SUBSALICYLATE 262 MG
524 TABLET,CHEWABLE ORAL 4 TIMES DAILY
Qty: 112 TABLET | Refills: 0 | Status: SHIPPED | OUTPATIENT
Start: 2023-01-12 | End: 2023-03-30

## 2023-01-12 RX ORDER — BUDESONIDE 3 MG/1
9 CAPSULE, COATED PELLETS ORAL DAILY
Qty: 90 CAPSULE | Refills: 3 | Status: SHIPPED | OUTPATIENT
Start: 2023-01-12 | End: 2023-04-12

## 2023-01-12 RX ORDER — METRONIDAZOLE 250 MG/1
250 TABLET ORAL 4 TIMES DAILY
Qty: 56 TABLET | Refills: 0 | Status: SHIPPED | OUTPATIENT
Start: 2023-01-12 | End: 2023-01-26

## 2023-01-12 NOTE — TELEPHONE ENCOUNTER
Please approve Rxs. Patient currently takes omeprazole 40 mg PO daily; he says he has plenty to take bid for the 14-day course of treatment for H. Pylori, so I did not send in another rx for that. Thanks!

## 2023-01-13 ENCOUNTER — TELEPHONE (OUTPATIENT)
Dept: GASTROENTEROLOGY | Facility: CLINIC | Age: 43
End: 2023-01-13
Payer: COMMERCIAL

## 2023-01-16 DIAGNOSIS — A04.8 BACTERIAL INFECTION DUE TO HELICOBACTER PYLORI: Primary | ICD-10-CM

## 2023-01-16 RX ORDER — TETRACYCLINE HYDROCHLORIDE 500 MG/1
500 CAPSULE ORAL 4 TIMES DAILY
Qty: 56 CAPSULE | Refills: 0 | Status: SHIPPED | OUTPATIENT
Start: 2023-01-16 | End: 2023-03-30

## 2023-01-16 NOTE — TELEPHONE ENCOUNTER
Please approve for this Rx to be sent to [facility] pharmacy instead of [facility] due to cost (per patient request). Thanks!

## 2023-01-16 NOTE — TELEPHONE ENCOUNTER
Returned patient's call. He would like the Rx for tetracycline (included in the quadruple therapy for H.pylori) changed from [facility] to [facility] pharmacy due to cost. He states the rest of the medications were affordable, but tetracycline was too expensive at [facility]. Rx refill sent to [facility] per patient request/pending approval. gary

## 2023-02-02 ENCOUNTER — TELEPHONE (OUTPATIENT)
Dept: GASTROENTEROLOGY | Facility: CLINIC | Age: 43
End: 2023-02-02
Payer: COMMERCIAL

## 2023-02-02 NOTE — TELEPHONE ENCOUNTER
Patient called to ask if he still needed to take the Pepto bismol chewable tablets after completing the 14-day quadruple therapy for treatment of H.pylori, and to verify what medications to be off of for 2 weeks prior to having urea breath test. Discussed being off of his omeprazole until after the breath test, and informed him that he did not need to continue taking the Pepto bismol daily. Patient verbalized understanding. He states he took omeprazole today, so will wait at least 2 weeks to have the breath test done. He will schedule follow up appt after that. gary

## 2023-02-16 ENCOUNTER — TELEPHONE (OUTPATIENT)
Dept: GASTROENTEROLOGY | Facility: CLINIC | Age: 43
End: 2023-02-16
Payer: COMMERCIAL

## 2023-02-16 NOTE — TELEPHONE ENCOUNTER
Patient called to inform me that he was due for his H.pylori breath test, but he has taken some meds for acid reflux in the past 2 weeks. Discussed that he needs to be off of all acid reflux meds for at least 2 weeks or the results of the breath test would not be accurate; he verbalized understanding. He states he got his budesonide refilled and is still taking 9 mg daily. He will schedule a follow up appt after he has the breath test to discuss results, symptoms, and weaning off of the budesonide. All questions answered at this time. lb

## 2023-03-02 ENCOUNTER — LAB (OUTPATIENT)
Dept: LAB | Facility: HOSPITAL | Age: 43
End: 2023-03-02
Payer: COMMERCIAL

## 2023-03-02 PROCEDURE — 83013 H PYLORI (C-13) BREATH: CPT

## 2023-03-13 ENCOUNTER — TELEPHONE (OUTPATIENT)
Dept: GASTROENTEROLOGY | Facility: CLINIC | Age: 43
End: 2023-03-13
Payer: COMMERCIAL

## 2023-03-13 NOTE — TELEPHONE ENCOUNTER
Patient called and left voicemail, requesting return call to discuss breath test.     Returned patient's call. He stated he wanted to give me the update that his breath test came back negative for H.pylori, and that he is feeling better. He states he's still on the steroid, and will schedule a follow up appt with one of our NPs. All questions answered. gary  
Calm

## 2023-03-30 ENCOUNTER — OFFICE VISIT (OUTPATIENT)
Dept: GASTROENTEROLOGY | Facility: CLINIC | Age: 43
End: 2023-03-30
Payer: COMMERCIAL

## 2023-03-30 VITALS
BODY MASS INDEX: 34.38 KG/M2 | HEART RATE: 80 BPM | OXYGEN SATURATION: 97 % | DIASTOLIC BLOOD PRESSURE: 85 MMHG | HEIGHT: 73 IN | WEIGHT: 259.4 LBS | TEMPERATURE: 96.6 F | SYSTOLIC BLOOD PRESSURE: 130 MMHG

## 2023-03-30 DIAGNOSIS — Z86.010 HX OF COLONIC POLYPS: ICD-10-CM

## 2023-03-30 DIAGNOSIS — Z12.11 COLON CANCER SCREENING: ICD-10-CM

## 2023-03-30 DIAGNOSIS — K21.9 GASTROESOPHAGEAL REFLUX DISEASE, UNSPECIFIED WHETHER ESOPHAGITIS PRESENT: Chronic | ICD-10-CM

## 2023-03-30 DIAGNOSIS — K52.839 MICROSCOPIC COLITIS, UNSPECIFIED MICROSCOPIC COLITIS TYPE: Primary | ICD-10-CM

## 2023-03-30 DIAGNOSIS — B96.81 HELICOBACTER PYLORI GASTRITIS: ICD-10-CM

## 2023-03-30 DIAGNOSIS — K29.70 HELICOBACTER PYLORI GASTRITIS: ICD-10-CM

## 2023-03-30 PROCEDURE — 99213 OFFICE O/P EST LOW 20 MIN: CPT | Performed by: NURSE PRACTITIONER

## 2023-03-30 NOTE — PATIENT INSTRUCTIONS
Continue omeprazole 40 mg once daily for GERD.     2.   For microscopic colitis, we will start the weaning process of budesonide. You will decrease to 2 tabs daily x 2 weeks then 1 tab daily x 2 weeks and this will complete your course of therapy.     3.  Should your diarrhea recur after cessation of budesonide, you may take either Imodium or Pepto Bismol and follow package instructions for use.    4. Recommend office follow up in 1 yr for reassessment of symptoms or sooner should symptoms worsen or recur.

## 2023-03-30 NOTE — PROGRESS NOTES
Chief Complaint   Patient presents with   • Follow-up     H. pylori gastritis, GERD, microscopic colitis, colon polyps         History of Present Illness  42-year-old male presents the office today for follow-up.  He was last seen in office on 10/28/2022.  He has a history of bloating, GERD, and increased stool frequency.  He has a family history of colon cancer in his maternal grandmother.  He underwent EGD and colonoscopy on 1/6/2023.  EGD revealed gastritis and a 10 mm pedunculated polyp which was removed and clip was placed.  Rare H. pylori organisms were noted on gastric body biopsy.  Gastric polyp was identified as an inflamed hyperplastic polyp with active moderate chronic inflammation with rare H. pylori organisms.  Colonoscopy revealed 3 hyperplastic polyps and internal hemorrhoids.  Random biopsies revealed increased intraepithelial lymphocytes, consistent with microscopic colitis.  Patient was started on budesonide.    Patient was prescribed drug regimen for management of H. pylori.  He continues omeprazole 40 mg once daily. He reports improvement in his upper GI symptoms. H. Pylori follow up breath test was negative.     He does feel that red meats can worsen symptoms. He continues use of budesonide 3 tabs daily. He has been taking this since his colonoscopy pathology results. He reports having an average of 2 BMs per day, which is a drastic improvement over previous symptoms. He will have some intestinal upset if he eats a poor diet. He has lost close to 20 lbs since his last OV. He denies any rectal bleeding. He has used Imodium in the past and has some at home. He has not had to use Imodium since starting budesonide.     Review of Systems   Constitutional: Positive for unexpected weight change. Negative for fever.   HENT: Negative for trouble swallowing.    Cardiovascular: Negative for chest pain.   Gastrointestinal: Negative for abdominal distention, abdominal pain, anal bleeding, blood in stool,  "constipation, diarrhea, nausea, rectal pain and vomiting.      Result Review :       Office Visit with Tony Kasey NICOMICHAEL (10/28/2022)  SCANNED - COLONOSCOPY (01/06/2023)  ENDOSCOPY, INT (01/06/2023)  H. Pylori Breath Test - Breath, Lung (03/02/2023 14:53)  Tissue Pathology Exam (01/06/2023 11:09)  SCANNED - LABS (01/06/2023)  H. Pylori Breath Test - Breath, Lung (03/02/2023 14:53)      Vital Signs:   /85   Pulse 80   Temp 96.6 °F (35.9 °C)   Ht 185.4 cm (72.99\")   Wt 118 kg (259 lb 6.4 oz)   SpO2 97%   BMI 34.23 kg/m²     Body mass index is 34.23 kg/m².     Physical Exam  Vitals reviewed.   Constitutional:       Appearance: Normal appearance.   Pulmonary:      Effort: Pulmonary effort is normal. No respiratory distress.   Abdominal:      General: Abdomen is flat. Bowel sounds are normal. There is no distension.      Palpations: Abdomen is soft. There is no mass.      Tenderness: There is no abdominal tenderness. There is no guarding.   Musculoskeletal:         General: Normal range of motion.   Skin:     General: Skin is warm and dry.   Neurological:      General: No focal deficit present.      Mental Status: He is alert.   Psychiatric:         Mood and Affect: Mood normal.         Behavior: Behavior normal.         Thought Content: Thought content normal.         Judgment: Judgment normal.       Assessment and Plan    Diagnoses and all orders for this visit:    1. Microscopic colitis, unspecified microscopic colitis type (Primary)  Comments:  New diagnosis    2. Helicobacter pylori gastritis  Comments:  New diagnosis    3. Gastroesophageal reflux disease, unspecified whether esophagitis present    4. Colon cancer screening    5. Hx of colonic polyps           Patient Instructions   1. Continue omeprazole 40 mg once daily for GERD.     2.   For microscopic colitis, we will start the weaning process of budesonide. You will decrease to 2 tabs daily x 2 weeks then 1 tab daily x 2 weeks and this will " complete your course of therapy.     3.  Should your diarrhea recur after cessation of budesonide, you may take either Imodium or Pepto Bismol and follow package instructions for use.    4. Recommend office follow up in 1 yr for reassessment of symptoms or sooner should symptoms worsen or recur.      Discussion:     EGD and colonoscopy findings and pathology reviewed with patient today during his office visit.  Patient completed treatment for H. pylori gastritis and is feeling much better.  He did notice a recurrence of symptoms when he discontinued omeprazole and now is back to taking it 40 mg once daily which we have recommended he continue for symptom management.    Microscopic colitis has responded very well to budesonide and stools have returned to a normal bowel pattern.  We will start the weaning process of budesonide as noted above.  Patient was recommended to use either Imodium or Pepto-Bismol should his diarrhea recur, but if symptoms are severe he was instructed to contact the office to schedule follow-up or for additional recommendations.  Recommend annual office follow-up for reassessment of symptoms and medication refills, or sooner should his symptoms worsen/recur.  Patient verbalized understanding of above plan of care and is in agreement.  All questions answered and support provided.  EMR Dragon/Transcription Disclaimer:  This document has been Dictated utilizing Dragon dictation.

## 2023-06-13 RX ORDER — BUSPIRONE HYDROCHLORIDE 10 MG/1
10 TABLET ORAL DAILY
Qty: 90 TABLET | Refills: 1 | Status: SHIPPED | OUTPATIENT
Start: 2023-06-13

## 2023-06-13 NOTE — TELEPHONE ENCOUNTER
Caller: Jeancarlos Zheng MATI    Relationship: Self    Best call back number: [phone]    Requested Prescriptions:   Requested Prescriptions     Pending Prescriptions Disp Refills    busPIRone (BUSPAR) 10 MG tablet 90 tablet 1     Sig: Take 1 tablet by mouth Daily.        Pharmacy where request should be sent: [facility] #[id number] - [place], [place] - [address] AT [facility] [address] - [phone]  - [phone] FX     Last office visit with prescribing clinician: 9/8/2021   Last telemedicine visit with prescribing clinician: Visit date not found   Next office visit with prescribing clinician: Visit date not found     Does the patient have less than a 3 day supply:  [x] Yes  [] No    Would you like a call back once the refill request has been completed: [x] Yes [] No    If the office needs to give you a call back, can they leave a voicemail: [x] Yes [] No    Red Jackson Rep   06/13/23 08:50 EDT

## 2023-06-13 NOTE — TELEPHONE ENCOUNTER
Rx Refill Note  Requested Prescriptions     Pending Prescriptions Disp Refills    busPIRone (BUSPAR) 10 MG tablet 90 tablet 1     Sig: Take 1 tablet by mouth Daily.      Last office visit with prescribing clinician: 9/8/2021   Last telemedicine visit with prescribing clinician: Visit date not found   Next office visit with prescribing clinician: 7/5/2023                         Would you like a call back once the refill request has been completed: [] Yes [] No    If the office needs to give you a call back, can they leave a voicemail: [] Yes [] No    Red Huddleston Rep  06/13/23, 13:14 EDT

## 2023-06-13 NOTE — TELEPHONE ENCOUNTER
Caller: Jeancarlos Zhneg    Relationship: Self    Best call back number: [phone]     What is the best time to reach you: ANYTIME     What was the call regarding: PATIENT HAS AN APPOINTMENT FOR 7/5/23 AND WOULD LIKE A PARTIAL FILL UNTIL HE CAN BE SEEN.

## 2023-07-24 ENCOUNTER — TELEPHONE (OUTPATIENT)
Dept: FAMILY MEDICINE CLINIC | Facility: CLINIC | Age: 43
End: 2023-07-24

## 2023-07-24 NOTE — TELEPHONE ENCOUNTER
Caller: Jeancarlos Zheng    Relationship: Self    Best call back number: [phone]    What is the best time to reach you: ANY     Who are you requesting to speak with (clinical staff, provider,  specific staff member): CLINICAL STAFF     Do you know the name of the person who called:     What was the call regarding: CALL BACK CONCERNING MEDICATION THAT WAS PRESCRIBED AT 7/21/23 APPT WITH JAMES EPLEY, SOME CONFUSION REGARDING WHAT WAS SENT TO THE PHARMACY     Is it okay if the provider responds through MyChart: NO

## 2023-07-25 NOTE — TELEPHONE ENCOUNTER
Caller: Jeancarlos Zheng    Relationship: Self    Best call back number: [phone]       What was the call regarding: PATIENT WOULD LIKE FOR SOMEONE TO GIVE HIM A CALL TO DISCUSS THE PREVIOUS MESSAGE ASAP

## 2023-07-26 NOTE — TELEPHONE ENCOUNTER
Spw pt went over medications, pt will contact his ins to see what type of continuous glucometer is covered. Pt will call back with that info. Ok for hub to read

## 2023-08-02 NOTE — TELEPHONE ENCOUNTER
Caller: Jeancarlos Zheng    Relationship to patient: Self    Best call back number: [phone]    Patient is needing: FREESTYLE IS WHAT THE INSURANCE WILL COVER. WITH SENSOR AND READER.

## 2023-08-04 NOTE — TELEPHONE ENCOUNTER
Placed order for freestyle santiago sensor. Please sign pended order. Sent as telephone encounter

## 2023-08-25 ENCOUNTER — OFFICE VISIT (OUTPATIENT)
Dept: FAMILY MEDICINE CLINIC | Facility: CLINIC | Age: 43
End: 2023-08-25
Payer: COMMERCIAL

## 2023-08-25 VITALS
BODY MASS INDEX: 32.68 KG/M2 | TEMPERATURE: 97.6 F | RESPIRATION RATE: 14 BRPM | OXYGEN SATURATION: 96 % | HEIGHT: 72 IN | WEIGHT: 241.3 LBS | HEART RATE: 76 BPM | SYSTOLIC BLOOD PRESSURE: 120 MMHG | DIASTOLIC BLOOD PRESSURE: 80 MMHG

## 2023-08-25 DIAGNOSIS — E11.9 NEW ONSET TYPE 2 DIABETES MELLITUS: Primary | ICD-10-CM

## 2023-08-25 DIAGNOSIS — R79.89 LOW TESTOSTERONE: ICD-10-CM

## 2023-08-25 DIAGNOSIS — Z00.00 HEALTH MAINTENANCE EXAMINATION: ICD-10-CM

## 2023-08-25 RX ORDER — LANCETS
EACH MISCELLANEOUS
Qty: 100 EACH | Refills: 12 | Status: SHIPPED | OUTPATIENT
Start: 2023-08-25

## 2023-08-25 RX ORDER — BLOOD-GLUCOSE METER
KIT MISCELLANEOUS
Qty: 1 EACH | Refills: 0 | Status: SHIPPED | OUTPATIENT
Start: 2023-08-25

## 2023-08-25 NOTE — PROGRESS NOTES
"Chief Complaint  Annual Exam    Subjective        Jeancarlos Zheng presents to [facility]  History of Present Illness  Pleasant patient here today, follow-up new onset diabetes mellitus type 2 had signs and symptoms of diabetes fatigue unexplained weight loss,  With hyperglycemia and diabetes,  Recently restarted on Tresiba he did not do well with metformin loose stools, discontinued  Taking Trulicity and doing well with this his sugars in the morning around 130,  Evening sugars, anywhere 140s 50s up to  170 or so  Which is a gigantic improvement, he is quite motivated to change his diet spectacular, really cut back alcohol just 1 day a week moderation, more salads, great support with his wife,.  She would like him to see an endocrinologist, as well as he is having some difficulty with his continuous monitor is helpful but it falls off when he sweats tried everything probably better off of the fingerstick.      Objective   Vital Signs:  /80   Pulse 76   Temp 97.6 øF (36.4 øC) (Infrared)   Resp 14   Ht 182.9 cm (72\")   Wt 109 kg (241 lb 4.8 oz)   SpO2 96%   BMI 32.73 kg/mý   Estimated body mass index is 32.73 kg/mý as calculated from the following:    Height as of this encounter: 182.9 cm (72\").    Weight as of this encounter: 109 kg (241 lb 4.8 oz).          Physical Exam  Vitals reviewed.   Constitutional:       General: He is not in acute distress.     Appearance: Normal appearance. He is not ill-appearing, toxic-appearing or diaphoretic.   Eyes:      Conjunctiva/sclera: Conjunctivae normal.      Pupils: Pupils are equal, round, and reactive to light.   Cardiovascular:      Rate and Rhythm: Normal rate and regular rhythm.   Pulmonary:      Effort: Pulmonary effort is normal. No respiratory distress.      Breath sounds: No stridor.   Abdominal:      General: Abdomen is flat. Bowel sounds are normal.   Skin:     General: Skin is warm and dry.   Neurological:      General: No " focal deficit present.      Mental Status: He is alert. Mental status is at baseline.   Psychiatric:         Mood and Affect: Mood normal.         Thought Content: Thought content normal.         Judgment: Judgment normal.      Result Review :                Assessment and Plan   Diagnoses and all orders for this visit:    1. New onset type 2 diabetes mellitus (Primary)  -     Hemoglobin A1c  -     CBC & Differential  -     Comprehensive Metabolic Panel  -     TSH Rfx On Abnormal To Free T4  -     Urinalysis With Microscopic If Indicated (No Culture) - Urine, Clean Catch  -     Lipid Panel With LDL / HDL Ratio  -     Testosterone, Free, Total  -     FSH & LH  -     C-Peptide  -     Insulin Antibody  -     Insulin, Total  -     Ferritin  -     Ambulatory Referral to Endocrinology    2. Low testosterone  -     Hemoglobin A1c  -     CBC & Differential  -     Comprehensive Metabolic Panel  -     TSH Rfx On Abnormal To Free T4  -     Urinalysis With Microscopic If Indicated (No Culture) - Urine, Clean Catch  -     Lipid Panel With LDL / HDL Ratio  -     Testosterone, Free, Total  -     FSH & LH  -     C-Peptide  -     Insulin Antibody  -     Insulin, Total  -     Ferritin  -     Ambulatory Referral to Endocrinology    3. Health maintenance examination  -     Hemoglobin A1c  -     CBC & Differential  -     Comprehensive Metabolic Panel  -     TSH Rfx On Abnormal To Free T4  -     Urinalysis With Microscopic If Indicated (No Culture) - Urine, Clean Catch  -     Lipid Panel With LDL / HDL Ratio  -     Testosterone, Free, Total  -     FSH & LH  -     C-Peptide  -     Insulin Antibody  -     Insulin, Total  -     Ferritin    Other orders  -     Dulaglutide 1.5 MG/0.5ML solution pen-injector; Inject 1.5 mg under the skin into the appropriate area as directed 1 (One) Time Per Week.  Dispense: 2 mL; Refill: 0             Follow Up   No follow-ups on file.  Patient was given instructions and counseling regarding his condition or  for health maintenance advice. Please see specific information pulled into the AVS if appropriate.     Patient Instructions   Discharge instructions  Continue present care you are doing spectacular, continue to refine your diabetic skills,    Endocrinology, diabetic education get scheduled for this,  Testosterone we will check levels today Labs today, 64 ounces water a day check glucose daily but alternating between breakfast before lunch before dinner bedtime keep a log  This helps us to adjust your medication what your glucose to average around 1 10-1 20 in the morning, if averaging much less may need to cut back your Tresiba by 5 units or more      Plan for prevention of hypoglycemia and early recognition treatment,    As long as you are doing well follow-up  6 weeks  Sooner for difficulties okay

## 2023-08-25 NOTE — PATIENT INSTRUCTIONS
Discharge instructions  Continue present care you are doing spectacular, continue to refine your diabetic skills,    Endocrinology, diabetic education get scheduled for this,  Testosterone we will check levels today Labs today, 64 ounces water a day check glucose daily but alternating between breakfast before lunch before dinner bedtime keep a log  This helps us to adjust your medication what your glucose to average around 1 10-1 20 in the morning, if averaging much less may need to cut back your Tresiba by 5 units or more      Plan for prevention of hypoglycemia and early recognition treatment,    As long as you are doing well follow-up  6 weeks  Sooner for difficulties okay

## 2023-09-04 LAB
ALBUMIN SERPL-MCNC: 4.4 G/DL (ref 4.1–5.1)
ALBUMIN/GLOB SERPL: 1.8 {RATIO} (ref 1.2–2.2)
ALP SERPL-CCNC: 71 IU/L (ref 44–121)
ALT SERPL-CCNC: 25 IU/L (ref 0–44)
APPEARANCE UR: CLEAR
AST SERPL-CCNC: 24 IU/L (ref 0–40)
BASOPHILS # BLD AUTO: 0.1 X10E3/UL (ref 0–0.2)
BASOPHILS NFR BLD AUTO: 1 %
BILIRUB SERPL-MCNC: 0.6 MG/DL (ref 0–1.2)
BILIRUB UR QL STRIP: NEGATIVE
BUN SERPL-MCNC: 14 MG/DL (ref 6–24)
BUN/CREAT SERPL: 19 (ref 9–20)
C PEPTIDE SERPL-MCNC: 3.3 NG/ML (ref 1.1–4.4)
CALCIUM SERPL-MCNC: 9.5 MG/DL (ref 8.7–10.2)
CHLORIDE SERPL-SCNC: 101 MMOL/L (ref 96–106)
CHOLEST SERPL-MCNC: 158 MG/DL (ref 100–199)
CO2 SERPL-SCNC: 25 MMOL/L (ref 20–29)
COLOR UR: YELLOW
CREAT SERPL-MCNC: 0.72 MG/DL (ref 0.76–1.27)
EGFRCR SERPLBLD CKD-EPI 2021: 117 ML/MIN/1.73
EOSINOPHIL # BLD AUTO: 1.1 X10E3/UL (ref 0–0.4)
EOSINOPHIL NFR BLD AUTO: 13 %
ERYTHROCYTE [DISTWIDTH] IN BLOOD BY AUTOMATED COUNT: 12.9 % (ref 11.6–15.4)
FERRITIN SERPL-MCNC: 278 NG/ML (ref 30–400)
FSH SERPL-ACNC: 2.2 MIU/ML (ref 1.5–12.4)
GLOBULIN SER CALC-MCNC: 2.4 G/DL (ref 1.5–4.5)
GLUCOSE SERPL-MCNC: 114 MG/DL (ref 70–99)
GLUCOSE UR QL STRIP: NEGATIVE
HBA1C MFR BLD: 9.5 % (ref 4.8–5.6)
HCT VFR BLD AUTO: 46.4 % (ref 37.5–51)
HDLC SERPL-MCNC: 50 MG/DL
HGB BLD-MCNC: 15 G/DL (ref 13–17.7)
HGB UR QL STRIP: NEGATIVE
IMM GRANULOCYTES # BLD AUTO: 0.1 X10E3/UL (ref 0–0.1)
IMM GRANULOCYTES NFR BLD AUTO: 1 %
INSULIN AB SER-ACNC: <5 UU/ML
INSULIN SERPL-ACNC: 16.3 UIU/ML (ref 2.6–24.9)
KETONES UR QL STRIP: NEGATIVE
LDLC SERPL CALC-MCNC: 89 MG/DL (ref 0–99)
LDLC/HDLC SERPL: 1.8 RATIO (ref 0–3.6)
LEUKOCYTE ESTERASE UR QL STRIP: NEGATIVE
LH SERPL-ACNC: 4.6 MIU/ML (ref 1.7–8.6)
LYMPHOCYTES # BLD AUTO: 1.9 X10E3/UL (ref 0.7–3.1)
LYMPHOCYTES NFR BLD AUTO: 23 %
MCH RBC QN AUTO: 30.2 PG (ref 26.6–33)
MCHC RBC AUTO-ENTMCNC: 32.3 G/DL (ref 31.5–35.7)
MCV RBC AUTO: 94 FL (ref 79–97)
MICRO URNS: NORMAL
MONOCYTES # BLD AUTO: 0.6 X10E3/UL (ref 0.1–0.9)
MONOCYTES NFR BLD AUTO: 7 %
NEUTROPHILS # BLD AUTO: 4.5 X10E3/UL (ref 1.4–7)
NEUTROPHILS NFR BLD AUTO: 55 %
NITRITE UR QL STRIP: NEGATIVE
PH UR STRIP: 7 [PH] (ref 5–7.5)
PLATELET # BLD AUTO: 272 X10E3/UL (ref 150–450)
POTASSIUM SERPL-SCNC: 4.3 MMOL/L (ref 3.5–5.2)
PROT SERPL-MCNC: 6.8 G/DL (ref 6–8.5)
PROT UR QL STRIP: NEGATIVE
RBC # BLD AUTO: 4.96 X10E6/UL (ref 4.14–5.8)
SODIUM SERPL-SCNC: 142 MMOL/L (ref 134–144)
SP GR UR STRIP: 1.02 (ref 1–1.03)
TESTOST FREE SERPL-MCNC: 6.4 PG/ML (ref 6.8–21.5)
TESTOST SERPL-MCNC: 305 NG/DL (ref 264–916)
TRIGL SERPL-MCNC: 106 MG/DL (ref 0–149)
TSH SERPL DL<=0.005 MIU/L-ACNC: 1.83 UIU/ML (ref 0.45–4.5)
UROBILINOGEN UR STRIP-MCNC: 0.2 MG/DL (ref 0.2–1)
VLDLC SERPL CALC-MCNC: 19 MG/DL (ref 5–40)
WBC # BLD AUTO: 8.2 X10E3/UL (ref 3.4–10.8)

## 2023-09-15 ENCOUNTER — TELEPHONE (OUTPATIENT)
Dept: FAMILY MEDICINE CLINIC | Facility: CLINIC | Age: 43
End: 2023-09-15

## 2023-10-06 ENCOUNTER — OFFICE VISIT (OUTPATIENT)
Dept: FAMILY MEDICINE CLINIC | Facility: CLINIC | Age: 43
End: 2023-10-06
Payer: COMMERCIAL

## 2023-10-06 VITALS
BODY MASS INDEX: 32.23 KG/M2 | DIASTOLIC BLOOD PRESSURE: 80 MMHG | TEMPERATURE: 97.1 F | HEART RATE: 77 BPM | WEIGHT: 238 LBS | SYSTOLIC BLOOD PRESSURE: 118 MMHG | OXYGEN SATURATION: 98 % | HEIGHT: 72 IN

## 2023-10-06 DIAGNOSIS — Z79.4 CONTROLLED TYPE 2 DIABETES MELLITUS WITH COMPLICATION, WITH LONG-TERM CURRENT USE OF INSULIN: Primary | ICD-10-CM

## 2023-10-06 DIAGNOSIS — E11.8 CONTROLLED TYPE 2 DIABETES MELLITUS WITH COMPLICATION, WITH LONG-TERM CURRENT USE OF INSULIN: Primary | ICD-10-CM

## 2023-10-06 PROCEDURE — 99214 OFFICE O/P EST MOD 30 MIN: CPT | Performed by: NURSE PRACTITIONER

## 2023-10-06 RX ORDER — DULAGLUTIDE 3 MG/.5ML
3 INJECTION, SOLUTION SUBCUTANEOUS WEEKLY
Qty: 2 ML | Refills: 0 | Status: SHIPPED | OUTPATIENT
Start: 2023-10-06

## 2023-10-06 NOTE — PATIENT INSTRUCTIONS
Discharge instructions continue the changes you have made, you are doing very well, increase Trulicity to 3 mg weekly  Update me in 2 to 3 weeks so I can move your dose up  Lab today then in 6 weeks a couple days before your next appointment  Push fluids  Walking daily.

## 2023-10-06 NOTE — PROGRESS NOTES
"Chief Complaint  Diabetes (6 wk f/u dm )    Subjective        Jeancarlos Zheng presents to [facility]  History of Present Illness  Pleasant patient here today for new onset diabetes mellitus type 2.  He is quite motivated for his health, recently  Started on Tresiba 10 mg at bedtime for basal insulin requirements,  Trulicity now 1.5 mg weekly he did not tolerate  Metformin starting at 1000 mg, causing abdominal pain.  Much better he is checking sugar daily couple times a day, his sugars are ranging from around 110  140 range,  Last A1c was trending down around 9 or so  Continues to make changes decreased alcohol consumption considerably.  Improving his diet and change in his weight.    No hypoglycemia periods  Diabetes    Objective   Vital Signs:  /80   Pulse 77   Temp 97.1 °F (36.2 °C)   Ht 182.9 cm (72\")   Wt 108 kg (238 lb)   SpO2 98%   BMI 32.28 kg/m²   Estimated body mass index is 32.28 kg/m² as calculated from the following:    Height as of this encounter: 182.9 cm (72\").    Weight as of this encounter: 108 kg (238 lb).    BMI is >= 30 and <35. (Class 1 Obesity). The following options were offered after discussion;: exercise counseling/recommendations, nutrition counseling/recommendations, and pharmacological intervention options      Physical Exam  Vitals reviewed.   Constitutional:       General: He is not in acute distress.     Appearance: He is well-developed. He is not ill-appearing, toxic-appearing or diaphoretic.   HENT:      Head: Normocephalic.      Nose: Nose normal.   Eyes:      General: No scleral icterus.     Conjunctiva/sclera: Conjunctivae normal.      Pupils: Pupils are equal, round, and reactive to light.   Neck:      Thyroid: No thyromegaly.      Vascular: No JVD.   Cardiovascular:      Rate and Rhythm: Normal rate and regular rhythm.      Heart sounds: Normal heart sounds. No murmur heard.    No friction rub. No gallop.   Pulmonary:      Effort: " Pulmonary effort is normal. No respiratory distress.      Breath sounds: Normal breath sounds. No stridor. No wheezing or rales.   Abdominal:      General: Bowel sounds are normal. There is no distension.      Palpations: Abdomen is soft.      Tenderness: There is no abdominal tenderness.      Comments: No hepatosplenomegaly, no ascites,   Musculoskeletal:         General: No tenderness.      Cervical back: Neck supple.   Lymphadenopathy:      Cervical: No cervical adenopathy.   Skin:     General: Skin is warm and dry.      Findings: No erythema or rash.   Neurological:      General: No focal deficit present.      Mental Status: He is alert and oriented to person, place, and time. Mental status is at baseline.      Deep Tendon Reflexes: Reflexes are normal and symmetric.   Psychiatric:         Mood and Affect: Mood normal.         Behavior: Behavior normal.         Thought Content: Thought content normal.         Judgment: Judgment normal.      Result Review :                Assessment and Plan   Diagnoses and all orders for this visit:    1. Controlled type 2 diabetes mellitus with complication, with long-term current use of insulin (Primary)  -     Hemoglobin A1c  -     Urinalysis With Culture If Indicated -  -     Hemoglobin A1c; Future  -     Urinalysis With Microscopic If Indicated (No Culture) - Urine, Clean Catch; Future    Other orders  -     Dulaglutide (Trulicity) 3 MG/0.5ML solution pen-injector; Inject 0.5 mL under the skin into the appropriate area as directed 1 (One) Time Per Week.  Dispense: 2 mL; Refill: 0           I spent 30  minutes caring for Jeancarlos on this date of service. This time includes time spent by me in the following activities:preparing for the visit, reviewing tests, obtaining and/or reviewing a separately obtained history, performing a medically appropriate examination and/or evaluation , counseling and educating the patient/family/caregiver, ordering medications, tests, or procedures,  documenting information in the medical record, and care coordination  Follow Up   Return in about 6 weeks (around 11/17/2023) for Labs today, Labs before next visit.  Patient was given instructions and counseling regarding his condition or for health maintenance advice. Please see specific information pulled into the AVS if appropriate.     Patient Instructions   Discharge instructions continue the changes you have made, you are doing very well, increase Trulicity to 3 mg weekly  Update me in 2 to 3 weeks so I can move your dose up  Lab today then in 6 weeks a couple days before your next appointment  Push fluids  Walking daily.     Discussed strategy to decrease risk of hypoglycemia identification and treatment

## 2023-10-07 LAB
APPEARANCE UR: CLEAR
BACTERIA #/AREA URNS HPF: NORMAL /HPF
BILIRUB UR QL STRIP: NEGATIVE
CASTS URNS QL MICRO: NORMAL /LPF
COLOR UR: YELLOW
EPI CELLS #/AREA URNS HPF: NORMAL /HPF (ref 0–10)
GLUCOSE UR QL STRIP: NEGATIVE
HBA1C MFR BLD: 6.6 % (ref 4.8–5.6)
HGB UR QL STRIP: NEGATIVE
KETONES UR QL STRIP: NEGATIVE
LEUKOCYTE ESTERASE UR QL STRIP: NEGATIVE
MICRO URNS: NORMAL
MICRO URNS: NORMAL
NITRITE UR QL STRIP: NEGATIVE
PH UR STRIP: 5.5 [PH] (ref 5–7.5)
PROT UR QL STRIP: NEGATIVE
RBC #/AREA URNS HPF: NORMAL /HPF (ref 0–2)
SP GR UR STRIP: 1.02 (ref 1–1.03)
URINALYSIS REFLEX: NORMAL
UROBILINOGEN UR STRIP-MCNC: 0.2 MG/DL (ref 0.2–1)
WBC #/AREA URNS HPF: NORMAL /HPF (ref 0–5)

## 2023-10-23 RX ORDER — INSULIN DEGLUDEC 100 U/ML
INJECTION, SOLUTION SUBCUTANEOUS
Qty: 3 ML | Refills: 2 | Status: SHIPPED | OUTPATIENT
Start: 2023-10-23

## 2023-11-13 ENCOUNTER — OFFICE VISIT (OUTPATIENT)
Dept: FAMILY MEDICINE CLINIC | Facility: CLINIC | Age: 43
End: 2023-11-13
Payer: COMMERCIAL

## 2023-11-13 VITALS
BODY MASS INDEX: 31.95 KG/M2 | SYSTOLIC BLOOD PRESSURE: 116 MMHG | HEIGHT: 72 IN | WEIGHT: 235.9 LBS | HEART RATE: 88 BPM | OXYGEN SATURATION: 97 % | TEMPERATURE: 98.4 F | DIASTOLIC BLOOD PRESSURE: 78 MMHG

## 2023-11-13 DIAGNOSIS — E11.8 CONTROLLED TYPE 2 DIABETES MELLITUS WITH COMPLICATION, WITHOUT LONG-TERM CURRENT USE OF INSULIN: Primary | ICD-10-CM

## 2023-11-13 DIAGNOSIS — E66.09 CLASS 1 OBESITY DUE TO EXCESS CALORIES WITH SERIOUS COMORBIDITY AND BODY MASS INDEX (BMI) OF 32.0 TO 32.9 IN ADULT: ICD-10-CM

## 2023-11-13 PROCEDURE — 99213 OFFICE O/P EST LOW 20 MIN: CPT | Performed by: NURSE PRACTITIONER

## 2023-11-13 RX ORDER — DULAGLUTIDE 4.5 MG/.5ML
4.5 INJECTION, SOLUTION SUBCUTANEOUS WEEKLY
Qty: 6 ML | Refills: 1 | Status: SHIPPED | OUTPATIENT
Start: 2023-11-13

## 2023-11-13 NOTE — PROGRESS NOTES
"Chief Complaint  Follow-up (6 weeks follow up )    Subjective        Jeancarlos Zheng presents to [facility]  History of Present Illness  Pleasant patient here today follow-up diabetes mellitus type 2, previously uncontrolled but he is doing substantial changes with his diet, medication, he is motivated making changes in his follow instructions well he has had no hypoglycemia issues and most of his sugars have been much better in the low 100 a few higher 170 or so, but most really well insulin Tresiba 10 units no problems  Trulicity 3 mg, feels better appetite is decreased and wants to continue making these changes.          Objective   Vital Signs:  /78 (BP Location: Left arm, Patient Position: Sitting, Cuff Size: Adult)   Pulse 88   Temp 98.4 °F (36.9 °C)   Ht 182.9 cm (72\")   Wt 107 kg (235 lb 14.4 oz)   SpO2 97%   BMI 31.99 kg/m²   Estimated body mass index is 31.99 kg/m² as calculated from the following:    Height as of this encounter: 182.9 cm (72\").    Weight as of this encounter: 107 kg (235 lb 14.4 oz).            Physical Exam  Constitutional:       General: He is not in acute distress.     Appearance: Normal appearance. He is not ill-appearing, toxic-appearing or diaphoretic.   Eyes:      Conjunctiva/sclera: Conjunctivae normal.      Pupils: Pupils are equal, round, and reactive to light.   Skin:     General: Skin is warm and dry.   Neurological:      General: No focal deficit present.      Mental Status: He is alert. Mental status is at baseline.   Psychiatric:         Mood and Affect: Mood normal.         Thought Content: Thought content normal.         Judgment: Judgment normal.        Result Review :                Assessment and Plan   Diagnoses and all orders for this visit:    1. Controlled type 2 diabetes mellitus with complication, without long-term current use of insulin (Primary)  -     Hemoglobin A1c; Future  -     Basic Metabolic Panel; Future  -     " Patient: Abhijeet Mccauley    Procedure(s):  CREATION, NEPHROSTOMY, PERCUTANEOUS    Diagnosis: Kidney stone [N20.0]  Diagnosis Additional Information: No value filed.    Anesthesia Type:   No value filed.     Note:  Airway :ETT  Patient transferred to:ICU  Comments: Pt intubated and ventilated with 100% O2, monitors on, transported with anesthesia.  VSS.  Report given to oncoming RN.  Transfer of care occurred.ICU Handoff: Call for PAUSE to initiate/utilize ICU HANDOFF, Identified Patient, Identified Responsible Provider, Reviewed the Pertinent Medical History, Discussed Surgical Course, Reviewed Intra-OP Anesthesia Management and Issues during Anesthesia, Set Expectations for Post Procedure Period and Allowed Opportunity for Questions and Acknowledgement of Understanding      Vitals: (Last set prior to Anesthesia Care Transfer)    [facility] VITALS  6/13/2020 1933 - 6/13/2020 2022 6/13/2020             EKG:  Sinus tachycardia                Electronically Signed By: LOPEZ Kim CRNA  June 13, 2020  8:22 PM   Urinalysis With Microscopic If Indicated (No Culture) - Urine, Clean Catch; Future    2. Class 1 obesity due to excess calories with serious comorbidity and body mass index (BMI) of 32.0 to 32.9 in adult  -     Hemoglobin A1c; Future  -     Basic Metabolic Panel; Future  -     Urinalysis With Microscopic If Indicated (No Culture) - Urine, Clean Catch; Future    Other orders  -     Dulaglutide (Trulicity) 4.5 MG/0.5ML solution pen-injector; Inject 0.5 mL under the skin into the appropriate area as directed 1 (One) Time Per Week.  Dispense: 6 mL; Refill: 1           I spent 20  minutes caring for Jeancarlos on this date of service. This time includes time spent by me in the following activities:preparing for the visit, reviewing tests, obtaining and/or reviewing a separately obtained history, performing a medically appropriate examination and/or evaluation , counseling and educating the patient/family/caregiver, ordering medications, tests, or procedures, documenting information in the medical record, and care coordination  Follow Up   No follow-ups on file.  Patient was given instructions and counseling regarding his condition or for health maintenance advice. Please see specific information pulled into the AVS if appropriate.   Strategies to mitigate risk and minimize any risk of hypoglycemia  He will simply stop insulin should he have hypoglycemic episodes in the morning, and prevention as above treatment should they arise discussed   Patient Instructions   Discharge instructions    Continue present care  A little bit of protein such as a teaspoon small of peanut butter or healthy fat protein at bedtime to decrease risk of hypoglycemia in the mornings check sugar every other day in the morning every other day either before lunch before dinner or bedtime this is somewhat of a postprandial to help guide your eating patterns,  Continue present care everything will continue to improve should you start to have hypoglycemia  sugars below 80 in the morning, we need to hold your long-acting insulin and likely discontinue  You could let me know after the fact    Otherwise continue present care  Update me some numbers in the morning in the next week or 2  And follow-up here in 6 weeks or so,    Outpatient lab nonfasting in 1 month a few days before your next appointment I will see you in 4 to 6 weeks  What ever works for you,  Outpatient lab [facility] outpatient lab at the [facility]  Or this office what ever works for you

## 2023-12-04 RX ORDER — BUSPIRONE HYDROCHLORIDE 10 MG/1
10 TABLET ORAL DAILY
Qty: 90 TABLET | Refills: 3 | Status: SHIPPED | OUTPATIENT
Start: 2023-12-04

## 2023-12-04 NOTE — TELEPHONE ENCOUNTER
Rx Refill Note  Requested Prescriptions     Pending Prescriptions Disp Refills    busPIRone (BUSPAR) 10 MG tablet [Pharmacy Med Name: BUSPIRONE 10MG TABLETS] 90 tablet 3     Sig: TAKE 1 TABLET BY MOUTH DAILY      Last office visit with prescribing clinician: 11/13/2023   Last telemedicine visit with prescribing clinician: Visit date not found   Next office visit with prescribing clinician: 1/3/2024                         Would you like a call back once the refill request has been completed: [] Yes [] No    If the office needs to give you a call back, can they leave a voicemail: [] Yes [] No    Red Huddleston Rep  12/04/23, 12:31 EST

## 2023-12-13 ENCOUNTER — TELEPHONE (OUTPATIENT)
Dept: FAMILY MEDICINE CLINIC | Facility: CLINIC | Age: 43
End: 2023-12-13

## 2023-12-13 NOTE — TELEPHONE ENCOUNTER
Caller: Jeancarlos Zheng    Relationship: Self    Best call back number: [phone]    What is the best time to reach you: ANY    Who are you requesting to speak with (clinical staff, provider,  specific staff member): JAMES EPLEY    Do you know the name of the person who called: PATIENT     What was the call regarding: SENDING IN SOME RESULTS    Is it okay if the provider responds through MyChart: NO

## 2023-12-13 NOTE — TELEPHONE ENCOUNTER
Pt called back stts that he would like for someone to give him a call back so that he may discuss his lab results.  Please and Thank You.

## 2023-12-22 ENCOUNTER — OFFICE VISIT (OUTPATIENT)
Dept: ENDOCRINOLOGY | Facility: CLINIC | Age: 43
End: 2023-12-22
Payer: COMMERCIAL

## 2023-12-22 ENCOUNTER — SPECIALTY PHARMACY (OUTPATIENT)
Dept: ENDOCRINOLOGY | Facility: CLINIC | Age: 43
End: 2023-12-22
Payer: COMMERCIAL

## 2023-12-22 VITALS
HEIGHT: 72 IN | BODY MASS INDEX: 32.64 KG/M2 | OXYGEN SATURATION: 98 % | SYSTOLIC BLOOD PRESSURE: 122 MMHG | DIASTOLIC BLOOD PRESSURE: 84 MMHG | WEIGHT: 241 LBS | HEART RATE: 84 BPM

## 2023-12-22 DIAGNOSIS — E66.9 CLASS 1 OBESITY WITH SERIOUS COMORBIDITY AND BODY MASS INDEX (BMI) OF 32.0 TO 32.9 IN ADULT, UNSPECIFIED OBESITY TYPE: ICD-10-CM

## 2023-12-22 DIAGNOSIS — Z79.4 TYPE 2 DIABETES MELLITUS WITH HYPERGLYCEMIA, WITH LONG-TERM CURRENT USE OF INSULIN: Primary | ICD-10-CM

## 2023-12-22 DIAGNOSIS — E11.65 TYPE 2 DIABETES MELLITUS WITH HYPERGLYCEMIA, WITH LONG-TERM CURRENT USE OF INSULIN: Primary | ICD-10-CM

## 2023-12-22 DIAGNOSIS — N52.9 ERECTILE DYSFUNCTION, UNSPECIFIED ERECTILE DYSFUNCTION TYPE: ICD-10-CM

## 2023-12-22 LAB — GLUCOSE BLDC GLUCOMTR-MCNC: 117 MG/DL (ref 70–105)

## 2023-12-22 PROCEDURE — 82948 REAGENT STRIP/BLOOD GLUCOSE: CPT | Performed by: INTERNAL MEDICINE

## 2023-12-22 RX ORDER — TADALAFIL 5 MG/1
5 TABLET ORAL DAILY PRN
Qty: 30 TABLET | Refills: 5 | Status: SHIPPED | OUTPATIENT
Start: 2023-12-22

## 2023-12-22 NOTE — PATIENT INSTRUCTIONS
Please,    - Continue Trulicity 4.5 mg once a week.  - Start Jardiance 10 mg 1 pill by mouth daily.    - Start Cialis 5 mg 1 pill by mouth daily    Check your blood sugar every other day in the morning.  Please maintain the log of your blood sugars.  Please make appointment for diabetic education.  Also make appointment for diabetic eye exam.    Please repeat blood work before next visit, nonfasting.    Thank you for your visit today.    If you have any questions or concerns please feel free to reach out of the office.

## 2023-12-22 NOTE — PROGRESS NOTES
-----------------------------------------------------------------  ENDOCRINE CLINIC NOTE  -----------------------------------------------------------------        PATIENT NAME: Jeancarlos Zheng  PATIENT : 1980 AGE: 43 y.o.  MRN NUMBER: [id number]  PRIMARY CARE: Epley, James, APRN    ==========================================================================    CHIEF COMPLAINT: T2DM and Low T  DATE OF SERVICE: 23    ==========================================================================    HPI / SUBJECTIVE    43 y.o. male is seen in the clinic today for evaluation and management of low T levels.    Diabetes Mellitus Hx:    -Diagnosis Date: Aug 2023  -Last known A1c: 6.6%    -Current Therapy / Medications:  Trulicity 4.5 mg once a week  Tresiba 10 units nightly    -Past tried medications: (Not currently using)  Metformin ER, unable to tolerate due to GI side effects    -Home BG logs / monitoring: Currently checking blood sugar every other day  2023: 122  2023: 100  2023: 112  December 10, 2023: 121  2023: 123  2023: 138  2023: 101  2023: 138  2023: 152    -Meals / Dietary Habits:  Breakfast, Glucerna  Lunch: [place]  Dinner: Homemade meal    -Last eye exam: No diabetic eye evaluation  -Last foot exam: Denied any numbness or tingling, denies any open wounds  -Nephropathy: No cathy CKD  -Smoking: Lifetime non-smoker  -Last lipid panel: 2023, not currently on any statin therapy    Patient denied any previous history of CAD or CVA.  Denied any family history of premature cardiovascular disease.  Denied any personal history or family history of pancreatitis or medullary thyroid cancer.    Low testosterone:    Patient during the last 6 months have been tested for low testosterone x 3.  Patient have noticed to have the symptoms for last couple of years.  Patient reports to have constant fatigue which has  slowly improved since he have lost weight.  Continues to have good libido but is experiencing some sexual dysfunction.  Reports to have decreased morning erections. Denies any change in mood or muscle mass. Denies any vision changes.  Current BMI is 32.69.  Denies any history of prostate issues.  Denied any change in testicular size.  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    ==========================================================================                                                PAST MEDICAL HISTORY    Past Medical History:   Diagnosis Date    Diabetes mellitus July       ==========================================================================    PAST SURGICAL HISTORY    History reviewed. No pertinent surgical history.    ==========================================================================    FAMILY HISTORY    Family History   Problem Relation Age of Onset    Depression Mother     Cancer Mother     Colon cancer Maternal Grandmother     Diabetes Paternal Grandfather     Colon polyps Neg Hx     Crohn's disease Neg Hx     Irritable bowel syndrome Neg Hx     Ulcerative colitis Neg Hx        ==========================================================================    SOCIAL HISTORY    Social History     Socioeconomic History    Marital status:    Tobacco Use    Smoking status: Never     Passive exposure: Never    Smokeless tobacco: Never   Substance and Sexual Activity    Alcohol use: Yes     Alcohol/week: 40.0 standard drinks of alcohol     Types: 10 Cans of beer, 30 Shots of liquor per week     Comment: PT STATES THIS WEEKLY    Drug use: Yes     Types: Marijuana    Sexual activity: Yes     Partners: Female       ==========================================================================    MEDICATIONS      Current Outpatient Medications:     busPIRone (BUSPAR) 10 MG tablet, TAKE 1 TABLET BY MOUTH DAILY, Disp: 90 tablet, Rfl: 3     Dulaglutide (Trulicity) 4.5 MG/0.5ML solution pen-injector, Inject 0.5 mL under the skin into the appropriate area as directed 1 (One) Time Per Week., Disp: 6 mL, Rfl: 1    glucose blood test strip, Use as instructed. Check blood sugar daily. Prescribe to pt what insurance will cover. DX:E11.9, Disp: 100 each, Rfl: 12    glucose monitor monitoring kit, Use as instructed. Check blood sugar daily. Prescribe to pt what insurance will cover. DX: E11.9, Disp: 1 each, Rfl: 0    Insulin Degludec FlexTouch 100 UNIT/ML solution pen-injector, INJECT 10 UNITS UNDER THE SKIN INTO APPROPRIATE AREA AS DIRECTED EVERY NIGHT, Disp: 3 mL, Rfl: 2    Insulin Pen Needle (BD Pen Needle Pratima U/F) 32G X 4 MM misc, 1 each Every Night., Disp: 100 each, Rfl: 2    Lancets (onetouch ultrasoft) lancets, Use as instructed. Check blood sugar daily. Please prescribe to pt what insurance will cover. DX:E11.9, Disp: 100 each, Rfl: 12    omeprazole (priLOSEC) 40 MG capsule, Take 1 capsule by mouth Daily., Disp: 90 capsule, Rfl: 3    ==========================================================================    ALLERGIES    No Known Allergies    ==========================================================================    OBJECTIVE    Vitals:    12/22/23 0751   BP: 122/84   Pulse: 84   SpO2: 98%     Body mass index is 32.69 kg/m².     General: Alert, cooperative, no acute distress  Thyroid:  no enlargement/tenderness/palpable nodules  Lungs: Clear to auscultation bilaterally, respirations unlabored  Heart: Regular rate and rhythm, S1 and S2 normal, no murmur, rub or gallop  Abdomen: Soft, NT, ND and Bowel sounds Positive  Extremities:  Extremities normal, atraumatic, no cyanosis or edema    ==========================================================================    LAB EVALUATION    Lab Results   Component Value Date    GLUCOSE 114 (H) 08/25/2023    BUN 14 08/25/2023    CREATININE 0.72 (L) 08/25/2023    EGFRIFNONA 113 09/15/2021    EGFRIFAFRI 137  09/15/2021    BCR 19 08/25/2023    K 4.3 08/25/2023    CO2 25 08/25/2023    CALCIUM 9.5 08/25/2023    PROTENTOTREF 6.8 08/25/2023    ALBUMIN 4.4 08/25/2023    LABIL2 1.8 08/25/2023    AST 24 08/25/2023    ALT 25 08/25/2023    TRIG 106 08/25/2023    HDL 50 08/25/2023    LDL 89 08/25/2023     Lab Results   Component Value Date    HGBA1C 6.60 (H) 10/06/2023    HGBA1C 9.5 (H) 08/25/2023    HGBA1C 11.40 (H) 07/07/2023     Lab Results   Component Value Date    CREATININE 0.72 (L) 08/25/2023     Lab Results   Component Value Date    TSH 1.830 08/25/2023       ==========================================================================    ASSESSMENT AND PLAN    # Type 2 Diabetes Mellitus with hyperglycemia  - Discussed with patient in detail about diagnosis of type 2 diabetes  - Discussed with patient about different therapeutic options  - Most likely patient was in a state of glucose toxicity at the time of diagnosis which have now improved  - Last known A1c was 6.6%  - Patient is intolerant to metformin therapy  - Will benefit from SGLT2 and GLP-1 receptor agonist therapy  - Already on GLP-1 receptor agonist therapy and tolerating well  - Will introduce SGLT2 inhibitor therapy with Jardiance 10 mg p.o. daily  - Benefit and side effect of therapy discussed with patient in detail to which she verbalized understanding  - Will discontinue insulin therapy for now  - Patient to continue checking blood sugar every other day in the morning  - Patient to maintain log before the next visit  - New adjusted therapy:  Trulicity 4.5 mg once a week on Sundays  Jardiance 10 mg 1 pill by mouth daily  - Patient referred for diabetic education  - Patient referred for diabetic eye exam as well    # Obesity with BMI of 32.69, patient already on GLP-1 receptor agonist therapy    # Low testosterone, patient recently had a blood work which showed testosterone within normal limits  # Erectile dysfunction, discussed with patient about different  "therapeutic options and after discussion plan is to start Cialis 5 mg p.o. daily, patient understand the benefit and side effect of therapy, counseled patient about importance of not taking this as needed on the top of 5 mg p.o. daily which will prone him to side effect      Thank you for courtesy of consultation.    Return to clinic: 3 months    Entire assessment and plan was discussed and counseled the patient in detail to which patient verbalized understanding and agreed with care.  Answered all queries and concerns.    This note was created using voice recognition software and is inherently subject to errors including those of syntax and \"sound-alike\" substitutions which may escape proofreading.  In such instances, original meaning may be extrapolated by contextual derivation.    Note: Portions of this note may have been copied from previous notes but documentation have been reviewed and edited as necessary to support clinical decision making for today's visit.    ==========================================================================    INFORMATION PROVIDED TO PATIENT    Patient Instructions   Please,    - Continue Trulicity 4.5 mg once a week.  - Start Jardiance 10 mg 1 pill by mouth daily.    - Start Cialis 5 mg 1 pill by mouth daily    Check your blood sugar every other day in the morning.  Please maintain the log of your blood sugars.  Please make appointment for diabetic education.  Also make appointment for diabetic eye exam.    Please repeat blood work before next visit, nonfasting.    Thank you for your visit today.    If you have any questions or concerns please feel free to reach out of the office.       ==========================================================================  Taz Casey MD  Department of Endocrine, Diabetes and Metabolism  [facility], [place]  ==========================================================================  "

## 2023-12-22 NOTE — PROGRESS NOTES
Specialty Pharmacy       Jeancarlos Zheng is a 43 y.o. male seen by an Endocrinology provider for Type 2 Diabetes.    Benefits Investigation Summary    Prescription: New Therapy- jardiance    Copay amount: $0    PLAN: RX Regencer[employer]  BIN: [id number]  PCN: [id number]  RX GROUP: [id number]    Prior Auth and Med Assistance notes: Processed test claim and jardiance and cialis are covered with a copay of $0. Provided patient with counseling on jardiance in clinic and answered all questions at this time.    JARDIANCE® (empagliflozin)  Medication Expectations   Why am I taking this medication? You are taking this medication to lower blood sugar because you have type 2 diabetes. Diabetes is not curable but with proper medication and treatment, we can keep your blood sugar within your personalized target range. This medication also helps reduce the risk of death from heart attack or stroke if you have heart disease and type 2 diabetes.   What should I expect while on this medication? You should expect to see your blood sugar and A1c decrease over time. You may also see a decrease in your blood pressure and it can help some people lose weight.     How does the medication work? Jardiance works by helping to remove some sugar that the body doesn't need through urination.    How long will I be on this medication for? The amount of time you will be on this medication will be determined by your doctor based on blood sugar and A1c control. You will most likely be on this medication or another diabetes medication throughout your lifetime. Do not abruptly stop this medication without talking to your doctor first.    How do I take this medication? Take as directed on your prescription label. This medication is usually taken in the morning and can be given with or without food.    What are some possible side effects? You may notice increased urination, especially when you first start Jardiance. The most common side effects are urinary tract  infections and yeast infections and are more commonly seen in females. Talk with your doctor if you notice white or yellow vaginal discharge, vaginal itching or odor of if you notice redness, itching, pain, or swelling of the penis and/or bad-smelling discharge from the penis.    What happens if I miss a dose? If you miss a dose, take it as soon as you remember. If it is close to your next dose, skip it (do not take 2 doses at once)     Medication Safety   What are things I should warn my doctor immediately about? Tell your doctor if you have kidney disease, liver disease, heart failure, pancreas problems, or history of frequent genital yeast or urinary tract infections. Tell your doctor if you are on a low-salt diet, if you drink alcohol, or if you are having surgery. Talk to your doctor if you are pregnant, planning to become pregnant, or breastfeeding. Also tell your doctor if you notice any signs/symptoms of an allergic reaction (rash, hives, difficulty breathing, etc.).   What are things that I should be cautious of? Be cautious of any side effects from this medication. Talk to your doctor if any new ones develop or aren't getting better.   What are some medications that can interact with this one? Some medications that interact include diuretics (water pills) and other medications that may also lower your blood sugar such as insulins and glipizide/glimepiride/glyburide. Your doctor may reduce the dose of these medications when you start Jardiance to minimize low blood sugars. Always tell your doctor or pharmacist immediately if you start taking any new medications, including over-the-counter medications, vitamins, and herbal supplements.      Medication Storage/Handling   How should I handle this medication? Keep this medication out of reach of pets/children in tightly sealed container   How does this medication need to be stored? Store at room temperature and keep dry (don't keep in bathroom or other room  with moisture)   How should I dispose of this medication? There should not be a need to dispose of this medication unless your provider decides to change the dose or therapy. If that is the case, take to your local police station for proper disposal. Some pharmacies also have take-back bins for medication drop-off.      Resources/Support   How can I remind myself to take this medication? You can download reminder apps to help you manage your refills. You may also set an alarm on your phone to remind you. The pharmacy carries pill boxes that you can place next to an area you pass everyday (such as where you place your car keys or where you charge your phone)   Is financial support available?  [email or web address] can provide co-pay cards if you have commercial insurance or patient assistance if you have Medicare or no insurance.    Which vaccines are recommended for me? Talk to your doctor about these vaccines: Flu, Coronavirus (COVID-19), Pneumococcal (pneumonia), Tdap, Hepatitis B, Zoster (shingles)          Lucy Wilson, PharmD  Clinical Specialty Pharmacist, Endocrinology  12/22/2023  15:02 EST

## 2024-01-15 ENCOUNTER — TELEPHONE (OUTPATIENT)
Dept: FAMILY MEDICINE CLINIC | Facility: CLINIC | Age: 44
End: 2024-01-15

## 2024-01-18 RX ORDER — INSULIN DEGLUDEC 100 U/ML
INJECTION, SOLUTION SUBCUTANEOUS
Qty: 3 ML | Refills: 2 | Status: SHIPPED | OUTPATIENT
Start: 2024-01-18

## 2024-01-19 ENCOUNTER — TELEPHONE (OUTPATIENT)
Dept: FAMILY MEDICINE CLINIC | Facility: CLINIC | Age: 44
End: 2024-01-19

## 2024-01-19 NOTE — TELEPHONE ENCOUNTER
Hub staff attempted to follow warm transfer process and was unsuccessful     Caller: Jeancarlos Zheng    Relationship to patient: Self    Best call back number: [phone]     Patient is needing: PLEASE CALL PATIENT TO SCHEDULE LABS

## 2024-01-24 ENCOUNTER — LAB (OUTPATIENT)
Dept: LAB | Facility: HOSPITAL | Age: 44
End: 2024-01-24
Payer: COMMERCIAL

## 2024-01-24 PROCEDURE — 83036 HEMOGLOBIN GLYCOSYLATED A1C: CPT | Performed by: NURSE PRACTITIONER

## 2024-01-24 PROCEDURE — 80048 BASIC METABOLIC PNL TOTAL CA: CPT | Performed by: NURSE PRACTITIONER

## 2024-01-24 PROCEDURE — 81003 URINALYSIS AUTO W/O SCOPE: CPT | Performed by: NURSE PRACTITIONER

## 2024-01-25 ENCOUNTER — TELEPHONE (OUTPATIENT)
Dept: ENDOCRINOLOGY | Facility: CLINIC | Age: 44
End: 2024-01-25
Payer: COMMERCIAL

## 2024-01-25 NOTE — TELEPHONE ENCOUNTER
Caller: Jeancarlos Zheng    Relationship to patient: Self    Best call back number: [phone]    Patient is needing: PATIENT CALLED AND STATED HIS FIRST REFILL WAS COVERED MAJORITY AND THE SECOND REFILL IS NOW $530, WANTING TO KNOW WHY IT IS SO MUCH     empagliflozin (Jardiance) 10 MG tablet tablet

## 2024-01-26 ENCOUNTER — OFFICE VISIT (OUTPATIENT)
Dept: FAMILY MEDICINE CLINIC | Facility: CLINIC | Age: 44
End: 2024-01-26
Payer: COMMERCIAL

## 2024-01-26 VITALS
SYSTOLIC BLOOD PRESSURE: 104 MMHG | TEMPERATURE: 97.5 F | BODY MASS INDEX: 31.44 KG/M2 | HEIGHT: 72 IN | HEART RATE: 69 BPM | DIASTOLIC BLOOD PRESSURE: 69 MMHG | OXYGEN SATURATION: 97 % | WEIGHT: 232.1 LBS

## 2024-01-26 DIAGNOSIS — E11.9 CONTROLLED TYPE 2 DIABETES MELLITUS WITHOUT COMPLICATION, WITHOUT LONG-TERM CURRENT USE OF INSULIN: Primary | ICD-10-CM

## 2024-01-26 DIAGNOSIS — F41.9 ANXIETY: ICD-10-CM

## 2024-01-26 RX ORDER — BUSPIRONE HYDROCHLORIDE 10 MG/1
10 TABLET ORAL DAILY
Qty: 90 TABLET | Refills: 3 | Status: SHIPPED | OUTPATIENT
Start: 2024-01-26

## 2024-01-26 RX ORDER — DULAGLUTIDE 4.5 MG/.5ML
4.5 INJECTION, SOLUTION SUBCUTANEOUS WEEKLY
Qty: 6 ML | Refills: 1 | Status: SHIPPED | OUTPATIENT
Start: 2024-01-26

## 2024-01-26 NOTE — PROGRESS NOTES
"Chief Complaint  Diabetes (6 week f/u)    Subjective        Jeancarlos Zheng presents to [facility]  History of Present Illness  Pleasant gentleman here today follow-up new onset diabetes mellitus, with obesity weight gain but does have actually weight loss with uncontrolled diabetes he is doing very well is very very motivated is had substantial dietary changes he is taking all his medications and regimens and he is doing well recently referred to endocrinology Trulicity    Basal insulin was changed to Jardiance he is doing well with this and recent A1c substantially decreased 6.2 in the prediabetes range, unfortunately went to  his prescription and it was $600, sure there are some mistake here but he will call to find out.  Otherwise doing fine no other problems, and wants to continue present plan.  BuSpar for anxiety he is doing quite well and he would like to continue    Up-to-date with dermatology had a full-body skin check everything checked out well about 3 months ago dermatology    He cannot tolerate metformin due to severe gastric/colon upset      Diabetes      Objective   Vital Signs:  /69   Pulse 69   Temp 97.5 °F (36.4 °C) (Temporal)   Ht 182.9 cm (72.01\")   Wt 105 kg (232 lb 1.6 oz)   SpO2 97%   BMI 31.47 kg/m²   Estimated body mass index is 31.47 kg/m² as calculated from the following:    Height as of this encounter: 182.9 cm (72.01\").    Weight as of this encounter: 105 kg (232 lb 1.6 oz).            Physical Exam  Vitals reviewed.   Constitutional:       General: He is not in acute distress.     Appearance: Normal appearance. He is well-developed. He is not ill-appearing, toxic-appearing or diaphoretic.   HENT:      Head: Normocephalic.      Nose: Nose normal.   Eyes:      General: No scleral icterus.     Conjunctiva/sclera: Conjunctivae normal.      Pupils: Pupils are equal, round, and reactive to light.   Neck:      Thyroid: No thyromegaly.      " Vascular: No JVD.   Cardiovascular:      Rate and Rhythm: Normal rate and regular rhythm.      Heart sounds: Normal heart sounds. No murmur heard.     No friction rub. No gallop.   Pulmonary:      Effort: Pulmonary effort is normal. No respiratory distress.      Breath sounds: Normal breath sounds. No stridor. No wheezing or rales.   Abdominal:      General: Bowel sounds are normal. There is no distension.      Palpations: Abdomen is soft.      Tenderness: There is no abdominal tenderness.      Comments: No hepatosplenomegaly, no ascites,   Musculoskeletal:         General: No tenderness.      Cervical back: Neck supple.   Lymphadenopathy:      Cervical: No cervical adenopathy.   Skin:     General: Skin is warm and dry.      Findings: No erythema or rash.   Neurological:      General: No focal deficit present.      Mental Status: He is alert and oriented to person, place, and time. Mental status is at baseline.      Deep Tendon Reflexes: Reflexes are normal and symmetric.   Psychiatric:         Mood and Affect: Mood normal.         Behavior: Behavior normal.         Thought Content: Thought content normal.         Judgment: Judgment normal.        Result Review :                Assessment and Plan   Diagnoses and all orders for this visit:    1. Controlled type 2 diabetes mellitus without complication, without long-term current use of insulin (Primary)    Other orders  -     Dulaglutide (Trulicity) 4.5 MG/0.5ML solution pen-injector; Inject 0.5 mL under the skin into the appropriate area as directed 1 (One) Time Per Week.  Dispense: 6 mL; Refill: 1  -     busPIRone (BUSPAR) 10 MG tablet; Take 1 tablet by mouth Daily.  Dispense: 90 tablet; Refill: 3      Refilled medications no change will continue same, he will follow-up with endocrinology as long as he is doing well see him back in 6 months       Follow Up   Return in about 6 months (around 7/26/2024).  Patient was given instructions and counseling regarding his  condition or for health maintenance advice. Please see specific information pulled into the AVS if appropriate.     Patient Instructions   Continue present plan you doing very well very pleased with your results and your efforts,  Yearly skin checks with Derm,  Continue BuSpar for the anxiety, healthy diet and exercise continue challenging 15 pounds over the next 6 to 12 months slow steady changes for good health, follow-up 6 months otherwise

## 2024-01-26 NOTE — PATIENT INSTRUCTIONS
Continue present plan you doing very well very pleased with your results and your efforts,  Yearly skin checks with Derm,  Continue BuSpar for the anxiety, healthy diet and exercise continue challenging 15 pounds over the next 6 to 12 months slow steady changes for good health, follow-up 6 months otherwise

## 2024-01-29 NOTE — TELEPHONE ENCOUNTER
Caller: Jeancarlos Zheng    Relationship to patient: Self    Best call back number: [phone]    Patient is needing: PATIENT WAS CALLING BACK TO CHECK ON HIS PRESCRIPTION COVERAGE. HE STATED HIS PRESCRIPTION COST FOR THE JARDIANCE HAS GONE UP TO $530 AND HE DOESN'T UNDERSTAND WHY AND NEEDS TO SPEAK WITH SOMEONE AS SOON AS POSSIBLE.

## 2024-01-31 ENCOUNTER — TELEPHONE (OUTPATIENT)
Dept: ENDOCRINOLOGY | Facility: CLINIC | Age: 44
End: 2024-01-31
Payer: COMMERCIAL

## 2024-01-31 NOTE — TELEPHONE ENCOUNTER
He was asking why price changed from the first time he got RX to the second time I told him to call the pharmacy and see why the price changed. This is something I can not answer, it would have to come from the pharmacy. I did offer him s discount card to help with the price of jardiance.

## 2024-02-05 ENCOUNTER — TELEPHONE (OUTPATIENT)
Dept: FAMILY MEDICINE CLINIC | Facility: CLINIC | Age: 44
End: 2024-02-05
Payer: COMMERCIAL

## 2024-02-05 NOTE — TELEPHONE ENCOUNTER
Pt picked up Jardiance samples today.  This medication would be $600 a month.  Pt is asking if he went back to his Insulin injections would that replace the Jardiance medication?  Asking for Epley and or his MA to give him a call back to discuss.  Thank You.

## 2024-02-06 NOTE — TELEPHONE ENCOUNTER
I think we have a couple boxes of samples to give him to buy some time to make a better decision    Several medications very similar same category as Jardiance  I need him to call his insurance company and see which 1 they recommend  So we can change his prescription and make it more affordable for him please let me know  There is other such as Farxiga Invokana which have very similar benefits and likely more affordable for him

## 2024-02-06 NOTE — TELEPHONE ENCOUNTER
Pt is aware and stated he was going to call his insurance company and then report back to the office.

## 2024-02-19 ENCOUNTER — PATIENT MESSAGE (OUTPATIENT)
Dept: ENDOCRINOLOGY | Facility: CLINIC | Age: 44
End: 2024-02-19
Payer: COMMERCIAL

## 2024-02-22 RX ORDER — GLIMEPIRIDE 2 MG/1
2 TABLET ORAL EVERY MORNING
Qty: 30 TABLET | Refills: 11 | Status: SHIPPED | OUTPATIENT
Start: 2024-02-22 | End: 2025-02-21

## 2024-02-22 NOTE — TELEPHONE ENCOUNTER
Called patient to discuss about the medication adjustment but no response. VM left to call back. If Jardiance is not affordable I have sent the script for Glimepiride 2 mgs once a day for him to start. Monitor BG everyday in the morning and maintain logs. He can take glimepiride along with Trulicity and no insulin for now.    Taz Casey MD  14:18 EST  02/22/24

## 2024-03-14 ENCOUNTER — OFFICE VISIT (OUTPATIENT)
Dept: GASTROENTEROLOGY | Facility: CLINIC | Age: 44
End: 2024-03-14
Payer: COMMERCIAL

## 2024-03-14 VITALS
SYSTOLIC BLOOD PRESSURE: 104 MMHG | HEART RATE: 78 BPM | BODY MASS INDEX: 32.89 KG/M2 | TEMPERATURE: 98.2 F | DIASTOLIC BLOOD PRESSURE: 70 MMHG | HEIGHT: 72 IN | OXYGEN SATURATION: 97 % | WEIGHT: 242.8 LBS

## 2024-03-14 DIAGNOSIS — Z12.11 COLON CANCER SCREENING: ICD-10-CM

## 2024-03-14 DIAGNOSIS — Z86.010 HX OF COLONIC POLYPS: ICD-10-CM

## 2024-03-14 DIAGNOSIS — K52.839 MICROSCOPIC COLITIS, UNSPECIFIED MICROSCOPIC COLITIS TYPE: Primary | Chronic | ICD-10-CM

## 2024-03-14 DIAGNOSIS — R10.12 LEFT UPPER QUADRANT ABDOMINAL PAIN: Chronic | ICD-10-CM

## 2024-03-14 DIAGNOSIS — K21.9 GASTROESOPHAGEAL REFLUX DISEASE, UNSPECIFIED WHETHER ESOPHAGITIS PRESENT: Chronic | ICD-10-CM

## 2024-03-14 RX ORDER — OMEPRAZOLE 40 MG/1
40 CAPSULE, DELAYED RELEASE ORAL DAILY
Qty: 90 CAPSULE | Refills: 3 | Status: SHIPPED | OUTPATIENT
Start: 2024-03-14

## 2024-03-14 NOTE — PATIENT INSTRUCTIONS
1.  For GERD, continue omeprazole 40 mg once daily.  Refills have been sent to your pharmacy.    2. For GERD, we recommend avoiding eating 3-4 hours before bedtime, eating smaller more frequent meals, and avoiding any known food triggers including spicy foods, tomatoes and tomato-based sauces, chocolate, coffee/tea, citrus fruits, carbonated  beverages and alcohol.     3.  For microscopic colitis you may use Imodium as needed based upon package instructions for use. If you decide that you would like to try a probiotic, I would recommend Flowboard or its generic equivalent.     4.  Next colonoscopy will be due at a 5-year interval, which would be January 2028.    5.  Plan for next office follow-up visit in 1 yr or sooner based upon symptoms.

## 2024-03-14 NOTE — PROGRESS NOTES
"Chief Complaint   Patient presents with    Follow-up     GERD, microscopic colitis         History of Present Illness  43-year-old male presents the office today for follow-up.  He was last seen in office on 3/30/2023.  He has a history of GERD, bloating, stool frequency, H. pylori gastritis, gastric polyp, colon polyps, and microscopic colitis.    He continues omeprazole 40 mg once daily for GERD.  He was previously treated for H. pylori and follow-up breath testing demonstrated eradication. He reports a weird feeling in his LUQ and is unsure if it has anything to do with the way he sits at work. When he was in [place] last week he had no symptoms.  He feels pretty confident that it is his posture that is causing the symptoms.    He has a history of microscopic colitis and had been taking budesonide.  This regimen was averaging 1 bowel movements per day with significant improvement in symptoms.  As of his last office visit he weaned off of the budesonide recommended to try either Imodium or Pepto-Bismol.  He is only had to take 4 Imodium in total over the past year. Next colonoscopy due January 2028 at a 5-year interval.    Result Review :       Office Visit with Sun Mittal APRN (03/30/2023)   SCANNED - COLONOSCOPY (01/06/2023)   ENDOSCOPY, INT (01/06/2023)   Tissue Pathology Exam (01/06/2023 11:09)   Vital Signs:   /70   Pulse 78   Temp 98.2 °F (36.8 °C)   Ht 182.9 cm (72\")   Wt 110 kg (242 lb 12.8 oz)   SpO2 97%   BMI 32.93 kg/m²     Body mass index is 32.93 kg/m².     Physical Exam  Vitals reviewed.   Constitutional:       Appearance: Normal appearance.   Pulmonary:      Effort: Pulmonary effort is normal. No respiratory distress.   Abdominal:      General: Abdomen is flat. Bowel sounds are normal. There is no distension.      Palpations: Abdomen is soft. There is no mass.      Tenderness: There is no abdominal tenderness. There is no guarding.   Musculoskeletal:         General: Normal range " of motion.   Skin:     General: Skin is warm and dry.   Neurological:      General: No focal deficit present.      Mental Status: He is alert and oriented to person, place, and time.   Psychiatric:         Mood and Affect: Mood normal.         Behavior: Behavior normal.         Thought Content: Thought content normal.         Judgment: Judgment normal.       Assessment and Plan    Diagnoses and all orders for this visit:    1. Microscopic colitis, unspecified microscopic colitis type (Primary)    2. Gastroesophageal reflux disease, unspecified whether esophagitis present    3. Colon cancer screening    4. Hx of colonic polyps    Other orders  -     omeprazole (priLOSEC) 40 MG capsule; Take 1 capsule by mouth Daily.  Dispense: 90 capsule; Refill: 3           Patient Instructions   1.  For GERD, continue omeprazole 40 mg once daily.  Refills have been sent to your pharmacy.    2. For GERD, we recommend avoiding eating 3-4 hours before bedtime, eating smaller more frequent meals, and avoiding any known food triggers including spicy foods, tomatoes and tomato-based sauces, chocolate, coffee/tea, citrus fruits, carbonated  beverages and alcohol.     3.  For microscopic colitis you may use Imodium as needed based upon package instructions for use. If you decide that you would like to try a probiotic, I would recommend [employer] or its generic equivalent.     4.  Next colonoscopy will be due at a 5-year interval, which would be January 2028.    5.  Plan for next office follow-up visit in 1 yr or sooner based upon symptoms.       Discussion:  Overall, patient is doing well from a GI standpoint.  Will have him continue omeprazole 40 mg once daily.  Refills have been sent to his pharmacy.  Antireflux precautions encouraged.  Ultimately, I feel that the discomfort that he gets in his left upper quadrant is due to his posture at work.  The symptoms went away when he was on vacation and he states that he has noticed  that when he sits in certain positions and leans over to the left and that he will have that discomfort.  Should this discomfort persist we could consider imaging for further workup.    For microscopic colitis he has only had to use an Imodium tablet 4 times since his last office follow-up.  He states that his bowel habits have changed somewhat and he generally has 1 bowel movement per day.  We did discuss the utility of a daily probiotic and recommendations were made.  Next colonoscopy will be due January 2028 for colon cancer screening.  Recommend annual office follow-up for reassessment of symptoms medication refills, or sooner should his symptoms recur or worsen.  Patient verbalized understanding of above plan of care and is in agreement.  All questions answered and support provided.      EMR Dragon/Transcription Disclaimer:  This document has been Dictated utilizing Dragon dictation.

## 2024-03-15 ENCOUNTER — OFFICE VISIT (OUTPATIENT)
Dept: ENDOCRINOLOGY | Facility: CLINIC | Age: 44
End: 2024-03-15
Payer: COMMERCIAL

## 2024-03-15 VITALS
SYSTOLIC BLOOD PRESSURE: 100 MMHG | DIASTOLIC BLOOD PRESSURE: 78 MMHG | HEART RATE: 87 BPM | BODY MASS INDEX: 32.78 KG/M2 | WEIGHT: 242 LBS | OXYGEN SATURATION: 98 % | HEIGHT: 72 IN

## 2024-03-15 DIAGNOSIS — E11.65 TYPE 2 DIABETES MELLITUS WITH HYPERGLYCEMIA, WITH LONG-TERM CURRENT USE OF INSULIN: Primary | ICD-10-CM

## 2024-03-15 DIAGNOSIS — E66.9 CLASS 1 OBESITY WITH SERIOUS COMORBIDITY AND BODY MASS INDEX (BMI) OF 32.0 TO 32.9 IN ADULT, UNSPECIFIED OBESITY TYPE: ICD-10-CM

## 2024-03-15 DIAGNOSIS — Z79.4 TYPE 2 DIABETES MELLITUS WITH HYPERGLYCEMIA, WITH LONG-TERM CURRENT USE OF INSULIN: Primary | ICD-10-CM

## 2024-03-15 DIAGNOSIS — N52.9 ERECTILE DYSFUNCTION, UNSPECIFIED ERECTILE DYSFUNCTION TYPE: ICD-10-CM

## 2024-03-15 RX ORDER — DULAGLUTIDE 4.5 MG/.5ML
4.5 INJECTION, SOLUTION SUBCUTANEOUS WEEKLY
Qty: 6 ML | Refills: 3 | Status: SHIPPED | OUTPATIENT
Start: 2024-03-15

## 2024-03-15 RX ORDER — GLIMEPIRIDE 2 MG/1
2 TABLET ORAL EVERY MORNING
Qty: 90 TABLET | Refills: 3 | Status: SHIPPED | OUTPATIENT
Start: 2024-03-15 | End: 2025-03-15

## 2024-03-15 NOTE — PATIENT INSTRUCTIONS
Please,    - Continue Trulicity 4.5 mg once a week.  - Continue glimepiride 2 mg once a day.    Check your blood sugar every other day in the morning.  Please maintain the log of your blood sugars.  Please make appointment for diabetic education.  Also make appointment for diabetic eye exam.    Please repeat blood work before next visit, fasting.    Thank you for your visit today.    If you have any questions or concerns please feel free to reach out of the office.

## 2024-03-15 NOTE — PROGRESS NOTES
-----------------------------------------------------------------  ENDOCRINE CLINIC NOTE  -----------------------------------------------------------------        PATIENT NAME: Jeancarlos Zheng  PATIENT : 1980 AGE: 43 y.o.  MRN NUMBER: [id number]  PRIMARY CARE: Epley, James, APRN    ==========================================================================    CHIEF COMPLAINT: T2DM  DATE OF SERVICE: 03/15/24    ==========================================================================    HPI / SUBJECTIVE    43 y.o. male is seen in the clinic today for follow up of T2DM.  Diagnosis Date: Aug 2023=  Current Therapy / Medications:  Trulicity 4.5 mg once a week  Glimepiride 2 mgs once a day  Past tried medications: (Not currently using)  Metformin ER, unable to tolerate due to GI side effects  Home BG logs / monitoring: finger sticks in AM  Meals / Dietary Habits:  Breakfast, Glucerna  Lunch: [employer]  Dinner: Homemade meal  No diabetic eye evaluation yet, referral was placed last visit  Denied any numbness or tingling, denies any open wounds  No cathy CKD  Lifetime non-smoker  Last lipid panel: 2023, not currently on any statin therapy  Patient denied any previous history of CAD or CVA.  Denied any family history of premature cardiovascular disease.  Denied any personal history or family history of pancreatitis or medullary thyroid cancer.    Low testosterone:    Patient during the last 6 months have been tested for low testosterone x 3.  Patient have noticed to have the symptoms for last couple of years.  Patient reports to have constant fatigue which has slowly improved since he have lost weight.  Continues to have good libido but is experiencing some sexual dysfunction.  Reports to have decreased morning erections. Denies any change in mood or muscle mass. Denies any vision changes.  Current BMI is 32.69.  Denies any history of prostate issues.  Denied any change in testicular size.  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    ==========================================================================                                                PAST MEDICAL HISTORY    Past Medical History:   Diagnosis Date    Anxiety     Diabetes mellitus July       ==========================================================================    PAST SURGICAL HISTORY    History reviewed. No pertinent surgical history.    ==========================================================================    FAMILY HISTORY    Family History   Problem Relation Age of Onset    Depression Mother     Cancer Mother     Colon cancer Maternal Grandmother     Diabetes Paternal Grandfather     Colon polyps Neg Hx     Crohn's disease Neg Hx     Irritable bowel syndrome Neg Hx     Ulcerative colitis Neg Hx        ==========================================================================    SOCIAL HISTORY    Social History     Socioeconomic History    Marital status:    Tobacco Use    Smoking status: Never     Passive exposure: Never    Smokeless tobacco: Never   Substance and Sexual Activity    Alcohol use: Yes     Alcohol/week: 40.0 standard drinks of alcohol     Types: 10 Cans of beer, 30 Shots of liquor per week     Comment: PT STATES THIS WEEKLY    Drug use: Yes     Types: Marijuana    Sexual activity: Yes     Partners: Female       ==========================================================================    MEDICATIONS      Current Outpatient Medications:     busPIRone (BUSPAR) 10 MG tablet, Take 1 tablet by mouth Daily., Disp: 90 tablet, Rfl: 3    Dulaglutide (Trulicity) 4.5 MG/0.5ML solution pen-injector, Inject 0.5 mL under the skin into the appropriate area as directed 1 (One) Time Per Week., Disp: 6 mL, Rfl: 1    glimepiride (Amaryl) 2 MG tablet, Take 1 tablet by mouth Every Morning., Disp: 30 tablet, Rfl: 11    glucose blood test strip, Use as instructed. Check blood sugar  daily. Prescribe to pt what insurance will cover. DX:E11.9, Disp: 100 each, Rfl: 12    glucose monitor monitoring kit, Use as instructed. Check blood sugar daily. Prescribe to pt what insurance will cover. DX: E11.9, Disp: 1 each, Rfl: 0    Lancets (onetouch ultrasoft) lancets, Use as instructed. Check blood sugar daily. Please prescribe to pt what insurance will cover. DX:E11.9, Disp: 100 each, Rfl: 12    omeprazole (priLOSEC) 40 MG capsule, Take 1 capsule by mouth Daily., Disp: 90 capsule, Rfl: 3    tadalafil (Cialis) 5 MG tablet, Take 1 tablet by mouth Daily As Needed for Erectile Dysfunction., Disp: 30 tablet, Rfl: 5    ==========================================================================    ALLERGIES    No Known Allergies    ==========================================================================    OBJECTIVE    Vitals:    03/15/24 0832   BP: 100/78   Pulse: 87   SpO2: 98%     Body mass index is 32.82 kg/m².     General: Alert, cooperative, no acute distress    ==========================================================================    LAB EVALUATION    Lab Results   Component Value Date    GLUCOSE 139 (H) 01/24/2024    BUN 17 01/24/2024    CREATININE 0.90 01/24/2024    EGFRIFNONA 113 09/15/2021    EGFRIFAFRI 137 09/15/2021    BCR 18.9 01/24/2024    K 4.4 01/24/2024    CO2 29.5 (H) 01/24/2024    CALCIUM 9.7 01/24/2024    PROTENTOTREF 6.8 08/25/2023    ALBUMIN 4.4 08/25/2023    LABIL2 1.8 08/25/2023    AST 24 08/25/2023    ALT 25 08/25/2023    TRIG 106 08/25/2023    HDL 50 08/25/2023    LDL 89 08/25/2023     Lab Results   Component Value Date    HGBA1C 6.20 (H) 01/24/2024    HGBA1C 6.60 (H) 10/06/2023    HGBA1C 9.5 (H) 08/25/2023     Lab Results   Component Value Date    CREATININE 0.90 01/24/2024     Lab Results   Component Value Date    TSH 1.830 08/25/2023       ==========================================================================    ASSESSMENT AND PLAN    # Type 2 Diabetes Mellitus with  "hyperglycemia  - Patient have repeat A1c of 6.2% on 1/24/2024  - Blood sugar reviewed and within target limits  - Continue Trulicity therapy along with glimepiride 2 mg  - Previously had tried to get SGLT2 inhibitor therapy but due to the cost patient was not able to afford  - Patient to continue checking blood sugars in the morning and maintain logs  - Therapy will be continued as:  Trulicity 4.5 mg once a week on Sundays  Glimiperdie 2 mgs once a day  - Patient referred for diabetic eye exam again    # Obesity with BMI of 32.82    # Erectile dysfunction, Cialis 5 mg once a day    Thank you for courtesy of consultation.    Return to clinic: 6 months    Entire assessment and plan was discussed and counseled the patient in detail to which patient verbalized understanding and agreed with care.  Answered all queries and concerns.    This note was created using voice recognition software and is inherently subject to errors including those of syntax and \"sound-alike\" substitutions which may escape proofreading.  In such instances, original meaning may be extrapolated by contextual derivation.    Note: Portions of this note may have been copied from previous notes but documentation have been reviewed and edited as necessary to support clinical decision making for today's visit.    ==========================================================================    INFORMATION PROVIDED TO PATIENT    Patient Instructions   Please,    - Continue Trulicity 4.5 mg once a week.  - Continue glimepiride 2 mg once a day.    Check your blood sugar every other day in the morning.  Please maintain the log of your blood sugars.  Please make appointment for diabetic education.  Also make appointment for diabetic eye exam.    Please repeat blood work before next visit, fasting.    Thank you for your visit today.    If you have any questions or concerns please feel free to reach out of the office. "       ==========================================================================  Taz Casey MD  Department of Endocrine, Diabetes and Metabolism  [facility]  [place], [place]  ==========================================================================

## 2024-04-01 ENCOUNTER — TELEPHONE (OUTPATIENT)
Dept: ENDOCRINOLOGY | Facility: CLINIC | Age: 44
End: 2024-04-01
Payer: COMMERCIAL

## 2024-04-01 NOTE — TELEPHONE ENCOUNTER
Provider: CHARLIE    Caller: GUILLERMO PINEDO     Relationship to Patient: SELF    Reason for Call:PATIENT STATES THE LAST COUPLE OF WEEKENDS ONLY HIS BLOOD SUGAR DROPS. OK DURING THE WEEKDAY, JUST THE WEEKEND. PLEASE ADVISE ON WHAT HE SHOULD DO.

## 2024-04-02 ENCOUNTER — TELEPHONE (OUTPATIENT)
Dept: ENDOCRINOLOGY | Facility: CLINIC | Age: 44
End: 2024-04-02
Payer: COMMERCIAL

## 2024-04-02 NOTE — TELEPHONE ENCOUNTER
Hub staff attempted to follow warm transfer process and was unsuccessful     Caller: Jeancarlos Zheng    Relationship to patient: Self    Best call back number: [phone]    Patient is needing: PT WAS RETURNING [facility] PHONE CALL. PLEASE CALL PT BACK

## 2024-04-02 NOTE — TELEPHONE ENCOUNTER
Spoke with patient and BS are dropping around 2:00 p.m. everyday. Pt is currently taking Trulicity 2 mg every Sunday and 2 mg Glimepiride every morning. Please advise.        Attending Attestation (For Attendings USE Only)...

## 2024-05-01 RX ORDER — BLOOD-GLUCOSE METER
1 EACH MISCELLANEOUS DAILY
Qty: 1 KIT | Refills: 0 | Status: SHIPPED | OUTPATIENT
Start: 2024-05-01

## 2024-05-01 RX ORDER — LANCETS
EACH MISCELLANEOUS
Qty: 100 EACH | Refills: 12 | Status: SHIPPED | OUTPATIENT
Start: 2024-05-01 | End: 2025-05-01

## 2024-05-01 RX ORDER — BLOOD SUGAR DIAGNOSTIC
STRIP MISCELLANEOUS
Qty: 50 EACH | Refills: 5 | Status: SHIPPED | OUTPATIENT
Start: 2024-05-01

## 2024-05-03 ENCOUNTER — TELEPHONE (OUTPATIENT)
Dept: ENDOCRINOLOGY | Facility: CLINIC | Age: 44
End: 2024-05-03
Payer: COMMERCIAL

## 2024-05-03 RX ORDER — DULAGLUTIDE 4.5 MG/.5ML
4.5 INJECTION, SOLUTION SUBCUTANEOUS WEEKLY
Qty: 2 ML | Refills: 0 | Status: SHIPPED | OUTPATIENT
Start: 2024-05-03 | End: 2024-05-06 | Stop reason: SDUPTHER

## 2024-05-03 NOTE — TELEPHONE ENCOUNTER
Pt has called pharmacy's and he can not find the Trulicity. He has got one more shot and he will be out. He is asking what he should. Please advise

## 2024-05-03 NOTE — TELEPHONE ENCOUNTER
There is 1 box available at [facility] pharmacy, I have sent out the prescription for him.

## 2024-05-06 ENCOUNTER — OFFICE VISIT (OUTPATIENT)
Dept: FAMILY MEDICINE CLINIC | Facility: CLINIC | Age: 44
End: 2024-05-06
Payer: COMMERCIAL

## 2024-05-06 VITALS
BODY MASS INDEX: 33.23 KG/M2 | DIASTOLIC BLOOD PRESSURE: 70 MMHG | HEART RATE: 80 BPM | OXYGEN SATURATION: 99 % | SYSTOLIC BLOOD PRESSURE: 110 MMHG | TEMPERATURE: 97.6 F | HEIGHT: 72 IN | WEIGHT: 245.3 LBS

## 2024-05-06 DIAGNOSIS — R35.0 URINARY FREQUENCY: ICD-10-CM

## 2024-05-06 DIAGNOSIS — R35.1 NOCTURIA: ICD-10-CM

## 2024-05-06 DIAGNOSIS — R30.0 DYSURIA: Primary | ICD-10-CM

## 2024-05-06 LAB
BILIRUB BLD-MCNC: NEGATIVE MG/DL
CLARITY, POC: CLEAR
COLOR UR: NORMAL
EXPIRATION DATE: NORMAL
GLUCOSE UR STRIP-MCNC: NEGATIVE MG/DL
KETONES UR QL: NEGATIVE
LEUKOCYTE EST, POC: NEGATIVE
Lab: NORMAL
NITRITE UR-MCNC: NEGATIVE MG/ML
PH UR: 6 [PH] (ref 5–8)
PROT UR STRIP-MCNC: NEGATIVE MG/DL
RBC # UR STRIP: NEGATIVE /UL
SP GR UR: 1.03 (ref 1–1.03)
UROBILINOGEN UR QL: NORMAL

## 2024-05-06 PROCEDURE — 81003 URINALYSIS AUTO W/O SCOPE: CPT | Performed by: FAMILY MEDICINE

## 2024-05-06 PROCEDURE — 99214 OFFICE O/P EST MOD 30 MIN: CPT | Performed by: FAMILY MEDICINE

## 2024-05-06 RX ORDER — SULFAMETHOXAZOLE AND TRIMETHOPRIM 800; 160 MG/1; MG/1
1 TABLET ORAL 2 TIMES DAILY
Qty: 20 TABLET | Refills: 0 | Status: SHIPPED | OUTPATIENT
Start: 2024-05-06

## 2024-05-06 RX ORDER — TAMSULOSIN HYDROCHLORIDE 0.4 MG/1
1 CAPSULE ORAL DAILY
Qty: 30 CAPSULE | Refills: 0 | Status: SHIPPED | OUTPATIENT
Start: 2024-05-06

## 2024-05-07 ENCOUNTER — TELEPHONE (OUTPATIENT)
Dept: FAMILY MEDICINE CLINIC | Facility: CLINIC | Age: 44
End: 2024-05-07

## 2024-05-07 LAB
BASOPHILS # BLD AUTO: 0.03 10*3/MM3 (ref 0–0.2)
BASOPHILS NFR BLD AUTO: 0.3 % (ref 0–1.5)
EOSINOPHIL # BLD AUTO: 0.14 10*3/MM3 (ref 0–0.4)
EOSINOPHIL NFR BLD AUTO: 1.5 % (ref 0.3–6.2)
ERYTHROCYTE [DISTWIDTH] IN BLOOD BY AUTOMATED COUNT: 12.6 % (ref 12.3–15.4)
HCT VFR BLD AUTO: 42.8 % (ref 37.5–51)
HGB BLD-MCNC: 14.3 G/DL (ref 13–17.7)
IMM GRANULOCYTES # BLD AUTO: 0.05 10*3/MM3 (ref 0–0.05)
IMM GRANULOCYTES NFR BLD AUTO: 0.5 % (ref 0–0.5)
LYMPHOCYTES # BLD AUTO: 2.59 10*3/MM3 (ref 0.7–3.1)
LYMPHOCYTES NFR BLD AUTO: 28.4 % (ref 19.6–45.3)
MCH RBC QN AUTO: 29.3 PG (ref 26.6–33)
MCHC RBC AUTO-ENTMCNC: 33.4 G/DL (ref 31.5–35.7)
MCV RBC AUTO: 87.7 FL (ref 79–97)
MONOCYTES # BLD AUTO: 0.79 10*3/MM3 (ref 0.1–0.9)
MONOCYTES NFR BLD AUTO: 8.7 % (ref 5–12)
NEUTROPHILS # BLD AUTO: 5.53 10*3/MM3 (ref 1.7–7)
NEUTROPHILS NFR BLD AUTO: 60.6 % (ref 42.7–76)
NRBC BLD AUTO-RTO: 0 /100 WBC (ref 0–0.2)
PLATELET # BLD AUTO: 322 10*3/MM3 (ref 140–450)
PSA SERPL-MCNC: 0.47 NG/ML (ref 0–4)
RBC # BLD AUTO: 4.88 10*6/MM3 (ref 4.14–5.8)
WBC # BLD AUTO: 9.13 10*3/MM3 (ref 3.4–10.8)

## 2024-05-07 NOTE — TELEPHONE ENCOUNTER
Caller: Jeancarlos Zheng    Relationship: Self    Best call back number: [phone]     What is the best time to reach you: ANYTIME    Who are you requesting to speak with (clinical staff, provider,  specific staff member):          What was the call regarding: PATIENT WAS CALLING WITH UPDATE PER YOUR REQUEST. PATIENT STATED THAT HE PICKED UP ANTIBIOTIC YESTERDAY ON 2ND PILL. LAST NIGHT IT WAS NOT AS BAD. STILL WOKE UP AROUND 2 AM - 5 AM WITH THE SAME SYMPTOMS.

## 2024-05-08 LAB
BACTERIA UR CULT: NO GROWTH
BACTERIA UR CULT: NORMAL

## 2024-05-08 NOTE — TELEPHONE ENCOUNTER
Make sure he finishes the antibiotic  Return to office if he still having symptoms after finishing the antibiotic if he has severe pain fever chills emergency room  He may have some prostatitis his urine cultures were negative  But prostate inflammation is common and he has the symptoms  Just need to make sure he improves, thank you

## 2024-05-09 ENCOUNTER — TELEPHONE (OUTPATIENT)
Dept: FAMILY MEDICINE CLINIC | Facility: CLINIC | Age: 44
End: 2024-05-09
Payer: COMMERCIAL

## 2024-05-13 ENCOUNTER — SPECIALTY PHARMACY (OUTPATIENT)
Dept: ENDOCRINOLOGY | Facility: CLINIC | Age: 44
End: 2024-05-13
Payer: COMMERCIAL

## 2024-05-13 PROBLEM — E11.65 TYPE 2 DIABETES MELLITUS WITH HYPERGLYCEMIA: Status: ACTIVE | Noted: 2024-05-13

## 2024-05-13 NOTE — PROGRESS NOTES
Specialty Pharmacy Patient Management Program  Endocrinology Initial Assessment     Jeancarlos Zheng was referred by an Endocrinology provider to the Endocrinology Patient Management program offered by [facility] for Type 2 Diabetes on 05/13/24.  An initial outreach was conducted, including assessment of therapy appropriateness and specialty medication education for Trulicity. The patient was introduced to services offered by [facility], including: regular assessments, refill coordination, curbside pick-up or mail order delivery options, prior authorization maintenance, and financial assistance programs as applicable. The patient was also provided with contact information for the pharmacy team.     Insurance Coverage & Financial Support  Benefits Investigation Summary    Prescription: Renewal     Dispensing pharmacy: [facility]    Copay amount: $132.52 due to unmet deductible and after $150 applied from copay card    PLAN: [facility]  BIN: [id number]  PCN: [id number]  RX GROUP: [id number]    Relevant Past Medical History and Comorbidities  Relevant medical history and concomitant health conditions were discussed with the patient. The patient's chart has been reviewed for relevant past medical history and comorbid conditions and updated as necessary.  Past Medical History:   Diagnosis Date    Anxiety     Diabetes mellitus July     Social History     Socioeconomic History    Marital status:    Tobacco Use    Smoking status: Never     Passive exposure: Never    Smokeless tobacco: Never   Substance and Sexual Activity    Alcohol use: Yes     Alcohol/week: 40.0 standard drinks of alcohol     Types: 10 Cans of beer, 30 Shots of liquor per week     Comment: PT STATES THIS WEEKLY    Drug use: Yes     Types: Marijuana    Sexual activity: Yes     Partners: Female       Problem list reviewed by Hamida Brannon, Mirza on 5/13/2024 at  2:30  PM    Allergies  Known allergies and reactions were discussed with the patient. The patient's chart has been reviewed for  allergy information and updated as necessary.   No Known Allergies    Allergies reviewed by aHmida Brannon PharmD on 5/13/2024 at  2:29 PM    Relevant Laboratory Values  Relevant laboratory values were discussed with the patient. The following specialty medication dose adjustment(s) are recommended: No dosage adjustments currently recommended  A1C Last 3 Results          8/25/2023    00:00 10/6/2023    08:57 1/24/2024    07:51   HGBA1C Last 3 Results   Hemoglobin A1C 9.5  6.60  6.20      Lab Results   Component Value Date    HGBA1C 6.20 (H) 01/24/2024     Lab Results   Component Value Date    GLUCOSE 139 (H) 01/24/2024    CALCIUM 9.7 01/24/2024     01/24/2024    K 4.4 01/24/2024    CO2 29.5 (H) 01/24/2024     01/24/2024    BUN 17 01/24/2024    CREATININE 0.90 01/24/2024    EGFRIFAFRI 137 09/15/2021    EGFRIFNONA 113 09/15/2021    BCR 18.9 01/24/2024    ANIONGAP 7.5 01/24/2024     Lab Results   Component Value Date    CHLPL 158 08/25/2023    TRIG 106 08/25/2023    HDL 50 08/25/2023    LDL 89 08/25/2023         Current Medication List  This medication list has been reviewed with the patient and evaluated for any interactions or necessary modifications/recommendations, and updated to include all prescription medications, OTC medications, and supplements the patient is currently taking.  This list reflects what is contained in the patient's profile, which has also been marked as reviewed to communicate to other providers it is the most up to date version of the patient's current medication therapy.     Current Outpatient Medications:     Accu-Chek Softclix Lancets lancets, Use as instructed, Disp: 100 each, Rfl: 12    Blood Glucose Monitoring Suppl (Accu-Chek Guide) w/Device kit, Use 1 each Daily., Disp: 1 kit, Rfl: 0    busPIRone (BUSPAR) 10 MG tablet, Take 1 tablet by mouth Daily.,  Disp: 90 tablet, Rfl: 3    Dulaglutide (Trulicity) 4.5 MG/0.5ML solution pen-injector, Inject 0.5 mL under the skin into the appropriate area as directed 1 (One) Time Per Week., Disp: 2 mL, Rfl: 0    glimepiride (Amaryl) 2 MG tablet, Take 1 tablet by mouth Every Morning. (Patient not taking: Reported on 5/6/2024), Disp: 90 tablet, Rfl: 3    glucose blood (Accu-Chek Guide) test strip, Use to check BS once daily / DX E11.65, Disp: 50 each, Rfl: 5    glucose blood test strip, Use as instructed. Check blood sugar daily. Prescribe to pt what insurance will cover. DX:E11.9, Disp: 100 each, Rfl: 12    glucose monitor monitoring kit, Use as instructed. Check blood sugar daily. Prescribe to pt what insurance will cover. DX: E11.9, Disp: 1 each, Rfl: 0    Lancets (onetouch ultrasoft) lancets, Use as instructed. Check blood sugar daily. Please prescribe to pt what insurance will cover. DX:E11.9, Disp: 100 each, Rfl: 12    omeprazole (priLOSEC) 40 MG capsule, Take 1 capsule by mouth Daily., Disp: 90 capsule, Rfl: 3    sulfamethoxazole-trimethoprim (Bactrim DS) 800-160 MG per tablet, Take 1 tablet by mouth 2 (Two) Times a Day., Disp: 20 tablet, Rfl: 0    tadalafil (Cialis) 5 MG tablet, Take 1 tablet by mouth Daily As Needed for Erectile Dysfunction., Disp: 30 tablet, Rfl: 5    tamsulosin (FLOMAX) 0.4 MG capsule 24 hr capsule, Take 1 capsule by mouth Daily., Disp: 30 capsule, Rfl: 0    Medicines reviewed by Hamida Brannon, PharmD on 5/13/2024 at  2:30 PM    Drug Interactions  No significant DDIs identified  Recommended Medications Assessment  Aspirin: Not Taking Currently  Statin: Not Taking Currently  ACEi/ARB: Not Taking Currently    Initial Education Provided for Specialty Medication  The patient has been provided with the following education and any applicable administration techniques (i.e. self-injection) have been demonstrated for the therapies indicated. All questions and concerns have been addressed prior to the  patient receiving the medication, and the patient has verbalized comprehension of the education and any materials provided. Additional patient education shall be provided and documented upon request by the patient, provider, or payer.    TRULICITY® (dulaglutide)  Medication Expectations   Why am I taking this medication? You are taking Trulicity, along with diet and exercise, to lower blood sugar because you have type 2 diabetes. Diabetes is not curable but with proper medication and treatment, you can keep your blood sugar within your personalized target range. This medication may also reduce the risk of heart attack or stroke   What should I expect while on this medication? You should expect to see your blood sugar and A1c decrease over time and you may also lose some weight.   How does the medication work? Trulicity is a non-insulin injection that works with your body's own ability to lower blood sugar and A1c and helps your body release its own insulin in response to your blood sugar rising.  This medication also slows down food from leaving your stomach, making you feel batista for longer.   How long will I be on this medication for? The amount of time you will be on this medication will be determined by your doctor based on blood sugar and A1c control. You will most likely be on this medication or another diabetes medication throughout your lifetime. Do not abruptly stop this medication without talking to your doctor first.    How do I take this medication? Take as directed on your prescription label. Trulicity is supplied in a single-use pen for each dose and you will use a new pre-filled pen each week.  It is injected under the skin (subcutaneously) of your stomach, thigh or upper arm. Use this medication once weekly, on the same day each week, with or without food.      What are some possible side effects? In addition to decreased appetite, the most common side effects are nausea and constipation.  Redness,  itching, and/or swelling can occur where the shot was given. Hypoglycemia can occur, especially if you are taking Trulicity with other medications that cause low blood sugar.    What happens if I miss a dose? If you miss a dose, take it as soon as you remember as long as there are at least 3 days until the next scheduled dose.  If there are less than 3 days, skip the missed dose and resume Trulicity on the regularly scheduled day.  Do not take 2 doses at the same time or extra doses.     Medication Safety   What are things I should warn my doctor immediately about? Do not use Trulicity if you or a family member have ever had medullary thyroid cancer (MTC) or Multiple Endocrine Neoplasia syndrome type 2 (MEN 2).  Tell your doctor if you have or have had problems with your kidneys or pancreas. Stop using Trulicity and get medical help right away if you have severe pain in your stomach area that will not go away. Talk to your doctor if you are pregnant, planning to become pregnant, or breastfeeding. Get medical help right away if you notice any signs/symptoms of an allergic reaction (rash, hives, difficulty breathing, etc.).   What are things that I should be cautious of? Be cautious of any side effects from this medication. Talk to your doctor if any new ones develop or aren't getting better.   What are some medications that can interact with this one? Taking Trulicity with other medications that also lower your blood sugar such as insulin and glipizide/glimepiride/glyburide may increase the risk of low blood sugar. Your doctor may reduce the dose of these medications when you start Trulicity. Always tell your doctor or pharmacist immediately if you start taking any new medications, including over-the-counter medications, vitamins, and herbal supplements.      Medication Storage/Handling   How should I handle this medication? Keep this medication out of reach of pets/children and keep the pen capped   How does this  medication need to be stored? Store Trulicity in the refrigerator. Do not freeze or use Trulicity that has been frozen.  You may store your Trulicity pens at room temperature for up to 14 days.     How should I dispose of this medication? Used Trulicity pens should discarded after each use (for single use only). If your doctor decides to stop this medication, take to your local police station for proper disposal. Some pharmacies also have take-back bins for medication drop-off.     Resources/Support   How can I remind myself to take this medication? You can download reminder apps to help you manage your refills, or set an alarm on your phone to remind you.    Is financial support available?  [employer] can provide co-pay cards if you have commercial insurance or patient assistance if you have Medicare or no insurance.    Which vaccines are recommended for me? Talk to your doctor about these vaccines: Flu, Coronavirus (COVID-19), Pneumococcal (pneumonia), Tdap, Hepatitis B, Zoster (shingles)       Adherence and Self-Administration  Adherence related to the patient's specialty therapy was discussed with the patient. The Adherence segment of this outreach has been reviewed and updated.     Is there a concern with patient's ability to self administer the medication correctly and without issue?: No  Were any potential barriers to adherence identified during the initial assessment or patient education?: No  Are there any concerns regarding the patient's understanding of the importance of medication adherence?: No  Methods for Supporting Patient Adherence and/or Self-Administration: N/A    Open Medication Therapy Problems  No medication therapy recommendations to display    Goals of Therapy  Goals related to the patient's specialty therapy were discussed with the patient. The Patient Goals segment of this outreach has been reviewed and updated.   Goals Addressed Today        Specialty Pharmacy General Goal      A1c < 7%    Lab  Results   Component Value Date    HGBA1C 6.20 (H) 01/24/2024    HGBA1C 6.60 (H) 10/06/2023    HGBA1C 9.5 (H) 08/25/2023    HGBA1C 11.40 (H) 07/07/2023                 Reassessment Plan & Follow-Up  1. Medication Therapy Changes: Continue Trulicity 4.5mg once weekly and glimepiride 2mg daily.   2. Related Plans, Therapy Recommendations, or Therapy Problems to Be Addressed: None  3. Pharmacist to perform regular assessments no more than (6) months from the previous assessment.  4. Care Coordinator to set up future refill outreaches, coordinate prescription delivery, and escalate clinical questions to pharmacist.  5. Welcome information and patient satisfaction survey to be sent by specialty pharmacy team with patient's initial fill.    Attestation  Therapeutic appropriateness: Appropriate   I attest the patient was actively involved in and has agreed to the above plan of care. If the prescribed therapy is at any point deemed not appropriate based on the current or future assessments, a consultation will be initiated with the patient's specialty care provider to determine the best course of action. The revised plan of therapy will be documented along with any required assessments and/or additional patient education provided.     Hamida Brannon, Mirza, BCPS, BCCCP  Clinical Specialty Pharmacist, Endocrinology  5/13/2024  14:33 EDT

## 2024-05-20 ENCOUNTER — OFFICE VISIT (OUTPATIENT)
Dept: FAMILY MEDICINE CLINIC | Facility: CLINIC | Age: 44
End: 2024-05-20
Payer: COMMERCIAL

## 2024-05-20 VITALS
SYSTOLIC BLOOD PRESSURE: 113 MMHG | DIASTOLIC BLOOD PRESSURE: 75 MMHG | BODY MASS INDEX: 32.51 KG/M2 | HEIGHT: 72 IN | RESPIRATION RATE: 16 BRPM | OXYGEN SATURATION: 99 % | WEIGHT: 240 LBS | HEART RATE: 72 BPM

## 2024-05-20 DIAGNOSIS — N41.0 ACUTE PROSTATITIS: Primary | ICD-10-CM

## 2024-05-20 DIAGNOSIS — E11.65 TYPE 2 DIABETES MELLITUS WITH HYPERGLYCEMIA, UNSPECIFIED WHETHER LONG TERM INSULIN USE: ICD-10-CM

## 2024-05-20 PROCEDURE — 99213 OFFICE O/P EST LOW 20 MIN: CPT | Performed by: NURSE PRACTITIONER

## 2024-05-20 NOTE — PROGRESS NOTES
"Chief Complaint  Follow-up (10 day follow up from [facility] )    Subjective        Jeancarlos Zheng presents to [facility]  History of Present Illness  Pleasant patient had urinary frequency urgency dysuria, recently started on antibiotics he is improved but he has left groin discomfort mostly at night but no bowel or bladder change no fever chills and his symptoms have improved,    Flomax as well,  He is here just to make sure things okay he has no chronic abdominal pain no chronic groin pain no bulging    Diabetes is improved control his sugars were lowering so he is without Amaryl now and only taking Trulicity and doing well, sugars are in the low mid 80s without symptoms,  During the day at least a couple x 1 to make sure this was okay endocrinology managing diabetes,          Objective   Vital Signs:  /75   Pulse 72   Resp 16   Ht 182.9 cm (72.01\")   Wt 109 kg (240 lb)   SpO2 99%   BMI 32.54 kg/m²   Estimated body mass index is 32.54 kg/m² as calculated from the following:    Height as of this encounter: 182.9 cm (72.01\").    Weight as of this encounter: 109 kg (240 lb).            Physical Exam  Vitals reviewed.   Constitutional:       General: He is not in acute distress.     Appearance: Normal appearance. He is well-developed. He is not ill-appearing, toxic-appearing or diaphoretic.   HENT:      Head: Normocephalic.      Nose: Nose normal.   Eyes:      General: No scleral icterus.     Conjunctiva/sclera: Conjunctivae normal.      Pupils: Pupils are equal, round, and reactive to light.   Neck:      Thyroid: No thyromegaly.      Vascular: No JVD.   Cardiovascular:      Rate and Rhythm: Normal rate and regular rhythm.      Heart sounds: Normal heart sounds. No murmur heard.     No friction rub. No gallop.   Pulmonary:      Effort: Pulmonary effort is normal. No respiratory distress.      Breath sounds: Normal breath sounds. No stridor. No wheezing or rales.   Abdominal:      " General: Bowel sounds are normal. There is no distension.      Palpations: Abdomen is soft. There is no mass.      Tenderness: There is no abdominal tenderness. There is no right CVA tenderness, left CVA tenderness, guarding or rebound.      Hernia: No hernia is present.      Comments: No hepatosplenomegaly, no ascites,   Genitourinary:     Comments: No inguinal hernia standing Valsalva testicular exam normal no bulge, normal left groin  Musculoskeletal:         General: No tenderness.      Cervical back: Neck supple.   Lymphadenopathy:      Cervical: No cervical adenopathy.   Skin:     General: Skin is warm and dry.      Findings: No erythema or rash.   Neurological:      General: No focal deficit present.      Mental Status: He is alert and oriented to person, place, and time. Mental status is at baseline.      Deep Tendon Reflexes: Reflexes are normal and symmetric.   Psychiatric:         Behavior: Behavior normal.         Thought Content: Thought content normal.         Judgment: Judgment normal.        Result Review :                Assessment and Plan   There are no diagnoses linked to this encounter.         Follow Up   No follow-ups on file.  Patient was given instructions and counseling regarding his condition or for health maintenance advice. Please see specific information pulled into the AVS if appropriate.     There are no Patient Instructions on file for this visit.

## 2024-05-20 NOTE — PATIENT INSTRUCTIONS
Lots of fluids, avoid bladder irritants, continue present plan with diabetes, you doing well, if true frequent hypoglycemia will decrease to Trulicity but this is unlikely    Finished medications for urination, you should see urology should you continue to have urinary complaints in a couple weeks,    Aleve 1 or 2 at bedtime with food for the next week or 2  Your groin pain needs to resolve over the next 1 to 3 weeks if not we should get a CAT scan or ultrasound update me in 2 weeks please severe pain fever chills emergency room as long as you are feeling well and doing well without pain and your diabetes is controlled, follow-up in 3 months    Update me 2 weeks please if your urinary complaints are resolved as well he should see Dr. Deng in urology notify me please

## 2024-06-04 DIAGNOSIS — R35.0 FREQUENT URINATION: Primary | ICD-10-CM

## 2024-06-04 DIAGNOSIS — R35.1 NOCTURIA: ICD-10-CM

## 2024-06-04 DIAGNOSIS — R35.1 FREQUENT URINATION AT NIGHT: ICD-10-CM

## 2024-06-04 RX ORDER — TAMSULOSIN HYDROCHLORIDE 0.4 MG/1
1 CAPSULE ORAL DAILY
Qty: 90 CAPSULE | Refills: 0 | Status: SHIPPED | OUTPATIENT
Start: 2024-06-04

## 2024-06-04 NOTE — TELEPHONE ENCOUNTER
Called and relayed to pt provider's recommendations.  Pt stated he has 3 pills left and at time of phone call his symptoms are clearing up, however if by the end of the medication his symptoms do not resolve he will schedule an appointment with Urology.  Pt voiced understanding.

## 2024-06-04 NOTE — TELEPHONE ENCOUNTER
Refilling generic Flomax has helped him to the bladder better  But his symptoms should have resolved by now   He should see urology just to make sure nothing else is going on, I referred him to Dr. Deng,  He does not need to go if his symptoms have resolved but he should not need the Flomax at that time just clarify otherwise go to urology thank you

## 2024-06-10 ENCOUNTER — SPECIALTY PHARMACY (OUTPATIENT)
Dept: ENDOCRINOLOGY | Facility: CLINIC | Age: 44
End: 2024-06-10
Payer: COMMERCIAL

## 2024-06-10 ENCOUNTER — TELEPHONE (OUTPATIENT)
Dept: ENDOCRINOLOGY | Facility: CLINIC | Age: 44
End: 2024-06-10
Payer: COMMERCIAL

## 2024-06-10 RX ORDER — DULAGLUTIDE 3 MG/.5ML
3 INJECTION, SOLUTION SUBCUTANEOUS WEEKLY
Qty: 6 ML | Refills: 0 | Status: SHIPPED | OUTPATIENT
Start: 2024-06-10

## 2024-06-10 NOTE — PROGRESS NOTES
Specialty Pharmacy Patient Management Program  Endocrinology Refill Outreach      Jeancarlos is a 43 y.o. male contacted today regarding refills of his medication(s).    Specialty medication(s) and dose(s) confirmed: Trulicity *3mg*    Refill Questions      Flowsheet Row Most Recent Value   Changes to allergies? No   Changes to medications? Yes   [Temporarily decreasing dose from 4.5mg to 3mg weekly d/t stock.]   New conditions or infections since last clinic visit No   Unplanned office visit, urgent care, ED, or hospital admission in the last 4 weeks  No   How does patient/caregiver feel medication is working? Very good   Financial problems or insurance changes  No   Since the previous refill, were any specialty medication doses or scheduled injections missed or delayed?  No   Does this patient require a clinical escalation to a pharmacist? No          Delivery Questions      Flowsheet Row Most Recent Value   Delivery method FedEx   Delivery address verified with patient/caregiver? Yes   Delivery address Home   Number of medications in delivery 1   Medication(s) being filled and delivered Dulaglutide   Doses left of specialty medications 0   Copay verified? Yes   Copay amount $40.11   Copay form of payment Credit/debit on file            Follow-Up: 28d - Consider increasing back to Trulicity 4.5mg at this time if stock permits.      Lucy Wilson, PharmD  Clinical Specialty Pharmacist, Endocrinology  6/10/2024  11:24 EDT

## 2024-06-10 NOTE — TELEPHONE ENCOUNTER
Specialty Pharmacy Patient Management Program       Jeancarlos Zheng is a 43 y.o. male seen by an Endocrinology provider for Type 2 Diabetes and enrolled in the Endocrinology Patient Management program offered by [facility].      Requested Prescriptions     Pending Prescriptions Disp Refills    Dulaglutide (Trulicity) 3 MG/0.5ML solution pen-injector 6 mL 0     Sig: Inject 0.5 mL under the skin into the appropriate area as directed 1 (One) Time Per Week.       Refill sent in to patient's pharmacy for above medication prescribed by Dr. Casey.   Last office visit 3/15/2024.  Next visit scheduled 9/20/2024.      Lucy Wilosn, PharmD  Clinical Specialty Pharmacist, Endocrinology  6/10/2024  11:11 EDT

## 2024-07-02 ENCOUNTER — TELEPHONE (OUTPATIENT)
Dept: ENDOCRINOLOGY | Facility: CLINIC | Age: 44
End: 2024-07-02
Payer: COMMERCIAL

## 2024-07-02 ENCOUNTER — SPECIALTY PHARMACY (OUTPATIENT)
Dept: ENDOCRINOLOGY | Facility: CLINIC | Age: 44
End: 2024-07-02
Payer: COMMERCIAL

## 2024-07-02 RX ORDER — DULAGLUTIDE 4.5 MG/.5ML
4.5 INJECTION, SOLUTION SUBCUTANEOUS WEEKLY
Qty: 6 ML | Refills: 1 | Status: SHIPPED | OUTPATIENT
Start: 2024-07-02

## 2024-07-02 NOTE — PROGRESS NOTES
Specialty Pharmacy Refill Coordination Note     Jeancarlos is a 43 y.o. male contacted today regarding refills of his specialty medication(s).    Specialty medication(s) and dose(s) confirmed: YES  Changes to medications: no  Changes to insurance: no  Reviewed and verified with patient:         Refill Questions      Flowsheet Row Most Recent Value   Changes to allergies? No   Changes to medications? No   New conditions or infections since last clinic visit No   Unplanned office visit, urgent care, ED, or hospital admission in the last 4 weeks  No   How does patient/caregiver feel medication is working? Good   Financial problems or insurance changes  No   Since the previous refill, were any specialty medication doses or scheduled injections missed or delayed?  No   Does this patient require a clinical escalation to a pharmacist? No            Delivery Questions      Flowsheet Row Most Recent Value   Delivery method FedEx   Delivery address verified with patient/caregiver? Yes   Delivery address Home   Number of medications in delivery 1   Medication(s) being filled and delivered Dulaglutide   Copay verified? Yes   Copay amount 40.11   Copay form of payment Credit/debit on file   Signature Required No                 Follow-up: 1 month(s)     Pinky Vences, Pharmacy Technician  Specialty Pharmacy Technician   7/2/2024   13:47 EDT

## 2024-07-26 ENCOUNTER — OFFICE VISIT (OUTPATIENT)
Dept: FAMILY MEDICINE CLINIC | Facility: CLINIC | Age: 44
End: 2024-07-26
Payer: COMMERCIAL

## 2024-07-26 ENCOUNTER — TELEPHONE (OUTPATIENT)
Dept: FAMILY MEDICINE CLINIC | Facility: CLINIC | Age: 44
End: 2024-07-26

## 2024-07-26 VITALS
HEIGHT: 72 IN | DIASTOLIC BLOOD PRESSURE: 67 MMHG | BODY MASS INDEX: 31.8 KG/M2 | HEART RATE: 90 BPM | WEIGHT: 234.8 LBS | OXYGEN SATURATION: 94 % | TEMPERATURE: 97.7 F | SYSTOLIC BLOOD PRESSURE: 102 MMHG

## 2024-07-26 DIAGNOSIS — Z23 NEED FOR TDAP VACCINATION: ICD-10-CM

## 2024-07-26 DIAGNOSIS — E11.65 TYPE 2 DIABETES MELLITUS WITH HYPERGLYCEMIA, WITHOUT LONG-TERM CURRENT USE OF INSULIN: Primary | ICD-10-CM

## 2024-07-26 DIAGNOSIS — F41.9 ANXIETY: ICD-10-CM

## 2024-07-26 DIAGNOSIS — E66.09 CLASS 1 OBESITY DUE TO EXCESS CALORIES WITH SERIOUS COMORBIDITY AND BODY MASS INDEX (BMI) OF 31.0 TO 31.9 IN ADULT: ICD-10-CM

## 2024-07-26 DIAGNOSIS — Z00.00 HEALTH MAINTENANCE EXAMINATION: ICD-10-CM

## 2024-07-26 RX ORDER — DULAGLUTIDE 4.5 MG/.5ML
4.5 INJECTION, SOLUTION SUBCUTANEOUS WEEKLY
Qty: 6 ML | Refills: 1 | Status: SHIPPED | OUTPATIENT
Start: 2024-07-26

## 2024-07-26 NOTE — PATIENT INSTRUCTIONS
Discharge instructions continue healthy diet and exercise doing spectacular focus on the basics slow steady changes and maintaining your progress possibly elbow some rooms with little bit more weight loss over the next year    As long as you are feeling good and doing well, see back in 6 months we will see what your cholesterol is and whether or not you need a statin at this time

## 2024-07-26 NOTE — PROGRESS NOTES
"Chief Complaint  Diabetes    Subjective        Jeancarlos Zheng presents to [facility]  History of Present Illness  Very pleasant gentleman here today follow-up diabetes mellitus type 2 associated with obesity, great changes in diet very motivated Trulicity has helped him along the way he did not tolerate metformin because gastric upset diarrhea even at low doses he had some supply difficulties but overall is doing well, his  Blood pressures nice very normotensive, he will be dizzy a lot when he stands the pressure    Buspirone for anxiety helps, he has a baby on the way soon and will needed Tdap,        Diabetes        Objective   Vital Signs:  /67   Pulse 90   Temp 97.7 °F (36.5 °C) (Temporal)   Ht 182.9 cm (72.01\")   Wt 107 kg (234 lb 12.8 oz)   SpO2 94%   BMI 31.84 kg/m²   Estimated body mass index is 31.84 kg/m² as calculated from the following:    Height as of this encounter: 182.9 cm (72.01\").    Weight as of this encounter: 107 kg (234 lb 12.8 oz).            Physical Exam  Vitals reviewed.   Constitutional:       General: He is not in acute distress.     Appearance: Normal appearance. He is well-developed. He is not ill-appearing, toxic-appearing or diaphoretic.   HENT:      Head: Normocephalic.      Nose: Nose normal.   Eyes:      General: No scleral icterus.     Conjunctiva/sclera: Conjunctivae normal.      Pupils: Pupils are equal, round, and reactive to light.   Neck:      Thyroid: No thyromegaly.      Vascular: No JVD.   Cardiovascular:      Rate and Rhythm: Normal rate and regular rhythm.      Heart sounds: Normal heart sounds. No murmur heard.     No friction rub. No gallop.   Pulmonary:      Effort: Pulmonary effort is normal. No respiratory distress.      Breath sounds: Normal breath sounds. No stridor. No wheezing or rales.   Abdominal:      General: Bowel sounds are normal. There is no distension.      Palpations: Abdomen is soft.      Tenderness: There is " no abdominal tenderness.      Comments: No hepatosplenomegaly, no ascites,   Musculoskeletal:         General: No tenderness.      Cervical back: Neck supple.   Lymphadenopathy:      Cervical: No cervical adenopathy.   Skin:     General: Skin is warm and dry.      Findings: No erythema or rash.   Neurological:      General: No focal deficit present.      Mental Status: He is alert and oriented to person, place, and time. Mental status is at baseline.      Deep Tendon Reflexes: Reflexes are normal and symmetric.   Psychiatric:         Behavior: Behavior normal.         Thought Content: Thought content normal.         Judgment: Judgment normal.        Result Review :                Assessment and Plan   Diagnoses and all orders for this visit:    1. Type 2 diabetes mellitus with hyperglycemia, without long-term current use of insulin (Primary)  -     Hemoglobin A1c  -     CBC & Differential  -     Comprehensive Metabolic Panel  -     Lipid Panel With LDL / HDL Ratio  -     TSH Rfx On Abnormal To Free T4  -     Urinalysis With Microscopic If Indicated (No Culture) - Urine, Clean Catch    2. Need for Tdap vaccination  -     Tdap Vaccine => 6yo IM (BOOSTRIX/ADACEL)  -     Hemoglobin A1c  -     CBC & Differential  -     Comprehensive Metabolic Panel  -     Lipid Panel With LDL / HDL Ratio  -     TSH Rfx On Abnormal To Free T4  -     Urinalysis With Microscopic If Indicated (No Culture) - Urine, Clean Catch    3. Class 1 obesity due to excess calories with serious comorbidity and body mass index (BMI) of 31.0 to 31.9 in adult  -     Hemoglobin A1c  -     CBC & Differential  -     Comprehensive Metabolic Panel  -     Lipid Panel With LDL / HDL Ratio  -     TSH Rfx On Abnormal To Free T4  -     Urinalysis With Microscopic If Indicated (No Culture) - Urine, Clean Catch    4. Anxiety  -     Hemoglobin A1c  -     CBC & Differential  -     Comprehensive Metabolic Panel  -     Lipid Panel With LDL / HDL Ratio  -     TSH Rfx On  Abnormal To Free T4  -     Urinalysis With Microscopic If Indicated (No Culture) - Urine, Clean Catch    5. Health maintenance examination  -     Hemoglobin A1c  -     CBC & Differential  -     Comprehensive Metabolic Panel  -     Lipid Panel With LDL / HDL Ratio  -     TSH Rfx On Abnormal To Free T4  -     Urinalysis With Microscopic If Indicated (No Culture) - Urine, Clean Catch    Other orders  -     Dulaglutide (Trulicity) 4.5 MG/0.5ML solution pen-injector; Inject 0.5 mL under the skin into the appropriate area as directed 1 (One) Time Per Week.  Dispense: 6 mL; Refill: 1             Follow Up   Return in about 6 months (around 1/26/2025) for Labs before next visit, Labs today, Annual physical.  Patient was given instructions and counseling regarding his condition or for health maintenance advice. Please see specific information pulled into the AVS if appropriate.     Patient Instructions   Discharge instructions continue healthy diet and exercise doing spectacular focus on the basics slow steady changes and maintaining your progress possibly elbow some rooms with little bit more weight loss over the next year    As long as you are feeling good and doing well, see back in 6 months we will see what your cholesterol is and whether or not you need a statin at this time       Discharge instructions continue healthy diet and exercise doing spectacular focus on the basics slow steady changes and maintaining your progress possibly elbow some rooms with little bit more weight loss over the next year    As long as you are feeling good and doing well, see back in 6 months we will see what your cholesterol is and whether or not you need a statin at this time    No change in medications will continue Trulicity 4.5 weekly injections,  Without change continue healthy diet, gradual weight loss   Continue present medication no changes follow American diabetes Association diabetic healthy eating plan

## 2024-07-27 LAB
ALBUMIN SERPL-MCNC: 4.6 G/DL (ref 3.5–5.2)
ALBUMIN/GLOB SERPL: 1.9 G/DL
ALP SERPL-CCNC: 69 U/L (ref 39–117)
ALT SERPL-CCNC: 17 U/L (ref 1–41)
APPEARANCE UR: CLEAR
AST SERPL-CCNC: 18 U/L (ref 1–40)
BASOPHILS # BLD AUTO: 0.02 10*3/MM3 (ref 0–0.2)
BASOPHILS NFR BLD AUTO: 0.3 % (ref 0–1.5)
BILIRUB SERPL-MCNC: 0.4 MG/DL (ref 0–1.2)
BILIRUB UR QL STRIP: NEGATIVE
BUN SERPL-MCNC: 16 MG/DL (ref 6–20)
BUN/CREAT SERPL: 19.5 (ref 7–25)
CALCIUM SERPL-MCNC: 9.8 MG/DL (ref 8.6–10.5)
CHLORIDE SERPL-SCNC: 104 MMOL/L (ref 98–107)
CHOLEST SERPL-MCNC: 151 MG/DL (ref 0–200)
CO2 SERPL-SCNC: 29.1 MMOL/L (ref 22–29)
COLOR UR: YELLOW
CREAT SERPL-MCNC: 0.82 MG/DL (ref 0.76–1.27)
EGFRCR SERPLBLD CKD-EPI 2021: 111.8 ML/MIN/1.73
EOSINOPHIL # BLD AUTO: 0.1 10*3/MM3 (ref 0–0.4)
EOSINOPHIL NFR BLD AUTO: 1.4 % (ref 0.3–6.2)
ERYTHROCYTE [DISTWIDTH] IN BLOOD BY AUTOMATED COUNT: 12.7 % (ref 12.3–15.4)
GLOBULIN SER CALC-MCNC: 2.4 GM/DL
GLUCOSE SERPL-MCNC: 105 MG/DL (ref 65–99)
GLUCOSE UR QL STRIP: NEGATIVE
HBA1C MFR BLD: 5.7 % (ref 4.8–5.6)
HCT VFR BLD AUTO: 46.5 % (ref 37.5–51)
HDLC SERPL-MCNC: 49 MG/DL (ref 40–60)
HGB BLD-MCNC: 15.2 G/DL (ref 13–17.7)
HGB UR QL STRIP: NEGATIVE
IMM GRANULOCYTES # BLD AUTO: 0.04 10*3/MM3 (ref 0–0.05)
IMM GRANULOCYTES NFR BLD AUTO: 0.6 % (ref 0–0.5)
KETONES UR QL STRIP: ABNORMAL
LDLC SERPL CALC-MCNC: 84 MG/DL (ref 0–100)
LDLC/HDLC SERPL: 1.68 {RATIO}
LEUKOCYTE ESTERASE UR QL STRIP: NEGATIVE
LYMPHOCYTES # BLD AUTO: 1.81 10*3/MM3 (ref 0.7–3.1)
LYMPHOCYTES NFR BLD AUTO: 26.1 % (ref 19.6–45.3)
MCH RBC QN AUTO: 29.8 PG (ref 26.6–33)
MCHC RBC AUTO-ENTMCNC: 32.7 G/DL (ref 31.5–35.7)
MCV RBC AUTO: 91.2 FL (ref 79–97)
MONOCYTES # BLD AUTO: 0.64 10*3/MM3 (ref 0.1–0.9)
MONOCYTES NFR BLD AUTO: 9.2 % (ref 5–12)
NEUTROPHILS # BLD AUTO: 4.33 10*3/MM3 (ref 1.7–7)
NEUTROPHILS NFR BLD AUTO: 62.4 % (ref 42.7–76)
NITRITE UR QL STRIP: NEGATIVE
NRBC BLD AUTO-RTO: 0 /100 WBC (ref 0–0.2)
PH UR STRIP: 7 [PH] (ref 5–8)
PLATELET # BLD AUTO: 300 10*3/MM3 (ref 140–450)
POTASSIUM SERPL-SCNC: 4.6 MMOL/L (ref 3.5–5.2)
PROT SERPL-MCNC: 7 G/DL (ref 6–8.5)
PROT UR QL STRIP: ABNORMAL
RBC # BLD AUTO: 5.1 10*6/MM3 (ref 4.14–5.8)
SODIUM SERPL-SCNC: 141 MMOL/L (ref 136–145)
SP GR UR STRIP: 1.03 (ref 1–1.03)
TRIGL SERPL-MCNC: 99 MG/DL (ref 0–150)
TSH SERPL DL<=0.005 MIU/L-ACNC: 1.63 UIU/ML (ref 0.27–4.2)
UROBILINOGEN UR STRIP-MCNC: ABNORMAL MG/DL
VLDLC SERPL CALC-MCNC: 18 MG/DL (ref 5–40)
WBC # BLD AUTO: 6.94 10*3/MM3 (ref 3.4–10.8)

## 2024-09-20 ENCOUNTER — OFFICE VISIT (OUTPATIENT)
Dept: ENDOCRINOLOGY | Facility: CLINIC | Age: 44
End: 2024-09-20
Payer: COMMERCIAL

## 2024-09-20 VITALS
HEIGHT: 72 IN | BODY MASS INDEX: 31.83 KG/M2 | WEIGHT: 235 LBS | SYSTOLIC BLOOD PRESSURE: 110 MMHG | HEART RATE: 89 BPM | DIASTOLIC BLOOD PRESSURE: 68 MMHG | OXYGEN SATURATION: 99 %

## 2024-09-20 DIAGNOSIS — N52.9 ERECTILE DYSFUNCTION, UNSPECIFIED ERECTILE DYSFUNCTION TYPE: ICD-10-CM

## 2024-09-20 DIAGNOSIS — E66.9 OBESITY (BMI 30-39.9): ICD-10-CM

## 2024-09-20 DIAGNOSIS — E11.9 TYPE 2 DIABETES MELLITUS WITHOUT COMPLICATION, WITHOUT LONG-TERM CURRENT USE OF INSULIN: Primary | ICD-10-CM

## 2024-09-20 LAB — GLUCOSE BLDC GLUCOMTR-MCNC: 115 MG/DL (ref 70–105)

## 2024-09-20 PROCEDURE — 82948 REAGENT STRIP/BLOOD GLUCOSE: CPT | Performed by: INTERNAL MEDICINE

## 2024-09-20 RX ORDER — DULAGLUTIDE 4.5 MG/.5ML
4.5 INJECTION, SOLUTION SUBCUTANEOUS WEEKLY
Qty: 6 ML | Refills: 1 | Status: SHIPPED | OUTPATIENT
Start: 2024-09-20

## 2024-09-20 RX ORDER — TADALAFIL 5 MG/1
5 TABLET ORAL DAILY PRN
Qty: 30 TABLET | Refills: 5 | Status: SHIPPED | OUTPATIENT
Start: 2024-09-20

## 2024-09-23 ENCOUNTER — SPECIALTY PHARMACY (OUTPATIENT)
Dept: ENDOCRINOLOGY | Facility: CLINIC | Age: 44
End: 2024-09-23
Payer: COMMERCIAL

## 2024-10-18 ENCOUNTER — TELEPHONE (OUTPATIENT)
Dept: ENDOCRINOLOGY | Facility: CLINIC | Age: 44
End: 2024-10-18
Payer: COMMERCIAL

## 2024-10-18 NOTE — TELEPHONE ENCOUNTER
Pt called asking for advise on covering his trulicity because the out of pocket is going to be around $700. I informed pt I would send a msg to provider and see what provider suggest and let him know pt stated understanding.

## 2024-10-19 NOTE — TELEPHONE ENCOUNTER
Harvey: Can you please help Mr. Zheng with Trulicity coverage.  If there is any change in preferred GLP-1 receptor agonist therapy we can switch him to either Ozempic or Mounjaro therapy.  Thanks.

## 2024-10-31 ENCOUNTER — SPECIALTY PHARMACY (OUTPATIENT)
Dept: ENDOCRINOLOGY | Facility: CLINIC | Age: 44
End: 2024-10-31
Payer: COMMERCIAL

## 2024-10-31 NOTE — TELEPHONE ENCOUNTER
Specialty Pharmacy Patient Management Program       Jeancarlos Zheng is a 44 y.o. male seen by an Endocrinology provider for Type 2 Diabetes and enrolled in the Endocrinology Patient Management program offered by [facility].      Requested Prescriptions     Pending Prescriptions Disp Refills    Insulin Glargine-yfgn 100 UNIT/ML solution pen-injector 15 mL 1     Sig: Inject 10 Units under the skin into the appropriate area as directed Daily.    Insulin Pen Needle (BD Pen Needle Pratima 2nd Gen) 32G X 4 MM misc 100 each 1     Sig: Use 1 each Daily.    glucose blood (Accu-Chek Guide) test strip 400 each 1     Si each by Other route 4 (Four) Times a Day. / DX E11.65    Lancets (onetouch ultrasoft) lancets 400 each 1     Si each by Other route 4 (Four) Times a Day. Please prescribe to pt what insurance will cover. DX E11.9       Refill sent in to patient's pharmacy for above medication prescribed pending provider approval by Dr. Casey.   Last office visit 2024.  Next visit scheduled 3/20/2025.      Harvey Becerril, PharmD, MPH  Clinical Specialty Pharmacist, Endocrinology  10/31/2024  16:41 EDT

## 2024-10-31 NOTE — TELEPHONE ENCOUNTER
Specialty Pharmacy Patient Management Program  One-Time Clinical Outreach     Jeancarlos Zheng is a 44 y.o. male seen by an Endocrinology provider for Type 2 Diabetes and enrolled in the Endocrinology Patient Management program offered by [facility].      Pt recently changed prescription insurance plan and Trulicity + all other GLP-1 RA options (including Rybelsus) are now unaffordable.  Also test billed all DPP-4 inhibitors and none will be affordable either.    Patient has expressed interest in changing to basal insulin.  Semglee covered under plan and will be $0 with coupon.  Pending Semglee 10 units daily to Dr. Caesy for signature, as well as testing supplies and pen needles.        Harvey Becerril, PharmD, MPH  Clinical Specialty Pharmacist, Endocrinology  10/31/2024  16:41 EDT

## 2024-11-01 RX ORDER — INSULIN GLARGINE-YFGN 100 [IU]/ML
10 INJECTION, SOLUTION SUBCUTANEOUS DAILY
Qty: 15 ML | Refills: 1 | Status: SHIPPED | OUTPATIENT
Start: 2024-11-01

## 2024-11-01 RX ORDER — LANCETS
1 EACH MISCELLANEOUS 4 TIMES DAILY
Qty: 400 EACH | Refills: 1 | Status: SHIPPED | OUTPATIENT
Start: 2024-11-01

## 2024-11-01 RX ORDER — BLOOD SUGAR DIAGNOSTIC
1 STRIP MISCELLANEOUS 4 TIMES DAILY
Qty: 400 EACH | Refills: 1 | Status: SHIPPED | OUTPATIENT
Start: 2024-11-01

## 2024-11-01 RX ORDER — PEN NEEDLE, DIABETIC 32GX 5/32"
1 NEEDLE, DISPOSABLE MISCELLANEOUS DAILY
Qty: 100 EACH | Refills: 1 | Status: SHIPPED | OUTPATIENT
Start: 2024-11-01

## 2024-11-05 NOTE — PROGRESS NOTES
Specialty Pharmacy Patient Management Program  Endocrinology Initial Assessment     Jeancarlos Zheng was referred by an Endocrinology provider to the Endocrinology Patient Management program offered by [facility] for Type 2 Diabetes on 11/05/24.  An initial outreach was conducted, including assessment of therapy appropriateness and specialty medication education for Insulin Glargine. The patient was introduced to services offered by [facility], including: regular assessments, refill coordination, curbside pick-up or mail order delivery options, prior authorization maintenance, and financial assistance programs as applicable. The patient was also provided with contact information for the pharmacy team.     Insurance Coverage & Financial Support  [facility] [employer]     Relevant Past Medical History and Comorbidities  Relevant medical history and concomitant health conditions were discussed with the patient. The patient's chart has been reviewed for relevant past medical history and comorbid conditions and updated as necessary.  Past Medical History:   Diagnosis Date    Anxiety     Diabetes mellitus July     Social History     Socioeconomic History    Marital status:    Tobacco Use    Smoking status: Never     Passive exposure: Never    Smokeless tobacco: Never   Substance and Sexual Activity    Alcohol use: Yes     Alcohol/week: 40.0 standard drinks of alcohol     Types: 10 Cans of beer, 30 Shots of liquor per week     Comment: PT STATES THIS WEEKLY    Drug use: Yes     Types: Marijuana    Sexual activity: Yes     Partners: Female       Problem list reviewed by Harvey Becerril, PharmDIA on 11/5/2024 at  7:28 AM    Allergies  Known allergies and reactions were discussed with the patient. The patient's chart has been reviewed for  allergy information and updated as necessary.   No Known Allergies    Allergies reviewed by Harvey Becerril, PharmDIA on 11/5/2024 at  7:27  AM    Relevant Laboratory Values  Relevant laboratory values were discussed with the patient. The following specialty medication dose adjustment(s) are recommended: Phone Initial Assessment - Starting Semglee 10u daily at this time, as pt recently changed prescription insurance plan and Trulicity + all other GLP-1 RA (including Rybelsus) and DPP-4 options are now unaffordable. Will follow and titrate as needed.      A1C Last 3 Results          1/24/2024    07:51 7/26/2024    08:51   HGBA1C Last 3 Results   Hemoglobin A1C 6.20  5.70      Lab Results   Component Value Date    HGBA1C 5.70 (H) 07/26/2024     Lab Results   Component Value Date    GLUCOSE 105 (H) 07/26/2024    CALCIUM 9.8 07/26/2024     07/26/2024    K 4.6 07/26/2024    CO2 29.1 (H) 07/26/2024     07/26/2024    BUN 16 07/26/2024    CREATININE 0.82 07/26/2024    EGFRIFAFRI 137 09/15/2021    EGFRIFNONA 113 09/15/2021    BCR 19.5 07/26/2024    ANIONGAP 7.5 01/24/2024     Lab Results   Component Value Date    CHLPL 151 07/26/2024    TRIG 99 07/26/2024    HDL 49 07/26/2024    LDL 84 07/26/2024         Current Medication List  This medication list has been reviewed with the patient and evaluated for any interactions or necessary modifications/recommendations, and updated to include all prescription medications, OTC medications, and supplements the patient is currently taking.  This list reflects what is contained in the patient's profile, which has also been marked as reviewed to communicate to other providers it is the most up to date version of the patient's current medication therapy.     Current Outpatient Medications:     Accu-Chek Softclix Lancets lancets, Use as instructed, Disp: 100 each, Rfl: 12    Blood Glucose Monitoring Suppl (Accu-Chek Guide) w/Device kit, Use 1 each Daily., Disp: 1 kit, Rfl: 0    busPIRone (BUSPAR) 10 MG tablet, Take 1 tablet by mouth Daily., Disp: 90 tablet, Rfl: 3    glucose blood (Accu-Chek Guide) test strip, 1 each  by Other route 4 (Four) Times a Day. / DX E11.65, Disp: 400 each, Rfl: 1    glucose blood test strip, Use as instructed. Check blood sugar daily. Prescribe to pt what insurance will cover. DX:E11.9, Disp: 100 each, Rfl: 12    glucose monitor monitoring kit, Use as instructed. Check blood sugar daily. Prescribe to pt what insurance will cover. DX: E11.9, Disp: 1 each, Rfl: 0    Insulin Glargine-yfgn 100 UNIT/ML solution pen-injector, Inject 10 Units under the skin into the appropriate area as directed Daily., Disp: 15 mL, Rfl: 1    Insulin Pen Needle (BD Pen Needle Pratima 2nd Gen) 32G X 4 MM misc, Use 1 each Daily., Disp: 100 each, Rfl: 1    Lancets (onetouch ultrasoft) lancets, 1 each by Other route 4 (Four) Times a Day. Please prescribe to pt what insurance will cover. DX E11.9, Disp: 400 each, Rfl: 1    omeprazole (priLOSEC) 40 MG capsule, Take 1 capsule by mouth Daily., Disp: 90 capsule, Rfl: 3    tadalafil (Cialis) 5 MG tablet, Take 1 tablet by mouth Daily As Needed for Erectile Dysfunction., Disp: 30 tablet, Rfl: 5    Medicines reviewed by Harvey Becerril, PharmD on 11/5/2024 at  7:28 AM    Drug Interactions  No Clinically Significant DDIs Were Identified at Present Time Upon Marking Medications Reviewed    Recommended Medications Assessment  Aspirin: Not Taking Currently  Statin: Not Taking Currently  ACEi/ARB: Not Taking Currently    Initial Education Provided for Specialty Medication  The patient has been provided with the following education and any applicable administration techniques (i.e. self-injection) have been demonstrated for the therapies indicated. All questions and concerns have been addressed prior to the patient receiving the medication, and the patient has verbalized comprehension of the education and any materials provided. Additional patient education shall be provided and documented upon request by the patient, provider, or payer.        insulin glargine  Medication Expectations    Why am I  taking this medication?  You are taking this medication to lower blood sugar and A1c because you have type 1 or type 2 diabetes. Diabetes is not curable but with proper medication and treatment, we can keep your blood sugar within your personalized target range.    What should I expect while on this medication?  You should expect to see your blood sugar and A1c decrease over time.    How does the medication work?  Insulin glargine (Basaglar, Lantus, Semglee, etc.) is a long-acting insulin that releases slowly and continuously in the body. Insulin helps the sugar in your blood get into cells and provide them with energy to fuel your body.     How long will I be on this medication for?  If you have Type 1 diabetes, you will be on this medication or another insulin throughout your lifetime because your body cannot make its own insulin. If you have Type 2 diabetes, the amount of time you will be on this medication will be determined by your doctor based on blood sugar and A1c control. You will most likely be on this medication or another diabetes medication throughout your lifetime because your body does not make enough insulin. Do not abruptly stop this medication without talking to your doctor first.     How do I take this medication?  Take as directed on your prescription label. Insulin glargine is usually given once or twice daily and can be taken with or without food. Insulin glargine  is injected under the skin (subcutaneously) of your stomach, thigh, buttocks, or upper arm. Use a different injection site in the same body region each day.     What are some possible side effects?  Insulin glargine  may cause low blood sugar (hypoglycemia). Signs and symptoms that may indicate hypoglycemia include dizziness, sweating, anxiety, irritability, confusion, or headache.    What happens if I miss a dose?  If you miss a dose, take it as soon as you remember. If it is close to your next dose, skip it. Never take 2 doses at  once.       Medication Safety    What are things I should warn my doctor immediately about?  Tell your doctor if you have kidney or liver problems. Talk to your doctor if you are pregnant, planning to become pregnant, or breastfeeding. Also tell your doctor if you notice any signs/symptoms of an allergic reaction (rash, hives, difficulty breathing, etc.).    What are things that I should be cautious of?  Be cautious of any side effects from this medication. Talk to your doctor if any new ones develop or aren't getting better.    What are some medications that can interact with this one?  Do not take this medication with rosiglitazone or macimorelin. Taking insulin glargine with other medications that lower your blood sugar may increase the risk of hypoglycemia. Your doctor may reduce the dose of these medications when you start insulin glargine  Always tell your doctor or pharmacist immediately if you start taking any new medications, including over-the-counter medications, vitamins, and herbal supplements.        Medication Storage/Handling    How should I handle this medication?  Keep this medication out of reach of pets/children and keep the pen capped when not in use. Do not share your medicine with others.     How does this medication need to be stored?  Store your new, unused pens in the original carton in the refrigerator. Protect it from light. Do not freeze. Always remove the needle and place the cap on the pen before storing.     How should I dispose of this medication?  Used insulin glargine  pens should be thrown away after 28 days even if insulin remains.?Place your used pen and needle in an approved sharps container?If your doctor decides to stop this medication, take to your local police station for proper disposal. Some pharmacies also have?take-back bins for medication drop-off.??       Resources/Support    How can I remind myself to take this medication?  You can download reminder apps to help you  manage your refills or set an alarm on your phone to remind you.     Is financial support available?   Manufacturers of insulin glargine  can provide co-pay cards if you have commercial insurance or patient assistance if you have Medicare or no insurance.     Which vaccines are recommended for me?  Talk to your doctor about these vaccines: Flu, Coronavirus (COVID-19), Pneumococcal (pneumonia), Tdap, Hepatitis B, Zoster (shingles)              Adherence and Self-Administration  Adherence related to the patient's specialty therapy was discussed with the patient. The Adherence segment of this outreach has been reviewed and updated.     Is there a concern with patient's ability to self administer the medication correctly and without issue?: No  Were any potential barriers to adherence identified during the initial assessment or patient education?: No  Are there any concerns regarding the patient's understanding of the importance of medication adherence?: No  Methods for Supporting Patient Adherence and/or Self-Administration: Continued  enrollment    Open Medication Therapy Problems  No medication therapy recommendations to display    Goals of Therapy  Goals related to the patient's specialty therapy were discussed with the patient. The Patient Goals segment of this outreach has been reviewed and updated.   Goals Addressed Today        Specialty Pharmacy General Goal      Maintain A1c <7%      Lab Results    Component Value Date     Phone Initial Assessment  11/05/2024 Phone Initial Assessment - Starting Semglee 10u daily at this time, as pt recently changed prescription insurance plan and Trulicity + all other GLP-1 RA (including Rybelsus) and DPP-4 options are now unaffordable. Will follow and titrate as needed.  CR    HGBA1C 5.70 (H) 07/26/2024     HGBA1C 6.20 (H) 01/24/2024     HGBA1C 6.60 (H) 10/06/2023     HGBA1C 9.5 (H) 08/25/2023     HGBA1C 11.40 (H) 07/07/2023                      Reassessment Plan &  Follow-Up  1. Medication Therapy Changes: Phone Initial Assessment - Starting Semglee 10u daily at this time, as pt recently changed prescription insurance plan and Trulicity + all other GLP-1 RA (including Rybelsus) and DPP-4 options are now unaffordable. Will follow and titrate as needed.    2. Related Plans, Therapy Recommendations, or Therapy Problems to Be Addressed: None  3. Pharmacist to perform regular assessments no more than (6) months from the previous assessment.  4. Care Coordinator to set up future refill outreaches, coordinate prescription delivery, and escalate clinical questions to pharmacist.  5. Welcome information and patient satisfaction survey to be sent by specialty pharmacy team with patient's initial fill.    Attestation  Therapeutic appropriateness: Appropriate   I attest the patient was actively involved in and has agreed to the above plan of care. If the prescribed therapy is at any point deemed not appropriate based on the current or future assessments, a consultation will be initiated with the patient's specialty care provider to determine the best course of action. The revised plan of therapy will be documented along with any required assessments and/or additional patient education provided.       Harvey Becerril, PharmD, MPH  Clinical Specialty Pharmacist, Endocrinology  11/5/2024  07:41 EST    Discussed the aforementioned information with the patient via Telephone.

## 2024-11-07 ENCOUNTER — SPECIALTY PHARMACY (OUTPATIENT)
Dept: ENDOCRINOLOGY | Facility: CLINIC | Age: 44
End: 2024-11-07
Payer: COMMERCIAL

## 2024-11-07 NOTE — PROGRESS NOTES
Specialty Pharmacy Patient Management Program  Endocrinology Refill Outreach      Jeancarlos is a 44 y.o. male contacted today regarding refills of his medication(s).    Specialty medication(s) and dose(s) confirmed: Semglee      Delivery Questions      Flowsheet Row Most Recent Value   Delivery method UPS   Delivery address verified with patient/caregiver? Yes   Delivery address Home   Number of medications in delivery 1   Medication(s) being filled and delivered Insulin Glargine-yfgn   Copay verified? Yes   Copay amount $0.00   Copay form of payment No copayment ($0)   Ship Date 11/11/2024   Delivery Date 11/12/2024   Signature Required No            Follow-Up: 150d      Harvey Becerril, PharmD, MPH  Clinical Specialty Pharmacist  11/7/2024  13:18 EST

## 2025-01-08 NOTE — TELEPHONE ENCOUNTER
Specialty Pharmacy Patient Management Program       Jeancarlos Zheng is a 44 y.o. male seen by an Endocrinology provider for Type 2 Diabetes and enrolled in the Endocrinology Patient Management program offered by [facility].      Pt previously took Trulicity but insurance changed and copay was no longer affordable.  Pt changed to Semglee d/t better cost, but since he has started a new plan as of 1/1/2025, Trulicity should be affordable again.  Pending Trulicity 0.75mg weekly to Dr. Casey for signature if appropriate.    Requested Prescriptions     Pending Prescriptions Disp Refills    Dulaglutide (Trulicity) 0.75 MG/0.5ML solution auto-injector 6 mL 1     Sig: Inject 0.75 mg under the skin into the appropriate area as directed 1 (One) Time Per Week.       Refill sent in to patient's pharmacy for above medication prescribed pending provider approval by Dr. Casey.   Last office visit 9/20/2024.  Next visit scheduled 3/20/2025.      Harvey Becerril, PharmD, MPH  Clinical Specialty Pharmacist, Endocrinology  1/8/2025  15:21 EST

## 2025-01-10 ENCOUNTER — SPECIALTY PHARMACY (OUTPATIENT)
Dept: ENDOCRINOLOGY | Facility: CLINIC | Age: 45
End: 2025-01-10
Payer: COMMERCIAL

## 2025-01-10 RX ORDER — DULAGLUTIDE 0.75 MG/.5ML
0.75 INJECTION, SOLUTION SUBCUTANEOUS WEEKLY
Qty: 6 ML | Refills: 1 | OUTPATIENT
Start: 2025-01-10

## 2025-01-10 NOTE — TELEPHONE ENCOUNTER
Sounds great, thanks!      Harvey Becerril, PharmD, MPH  Clinical Specialty Pharmacist, Endocrinology  1/10/2025  10:18 EST

## 2025-01-10 NOTE — PROGRESS NOTES
Specialty Pharmacy Patient Management Program       Jeancarlos Zheng is a 44 y.o. male seen by an Endocrinology provider for Type 2 Diabetes and enrolled in the Endocrinology Patient Management program offered by [facility].      Requested Prescriptions     Pending Prescriptions Disp Refills    Dulaglutide 4.5 MG/0.5ML solution auto-injector 6 mL 1     Sig: Inject 4.5 mg under the skin into the appropriate area as directed 1 (One) Time Per Week.       Refill sent in to patient's pharmacy for above medication prescribed per telephone order by Dr. Casey.   Last office visit 9/20/2024.  Next visit scheduled 3/20/2025.      Harvey Becerril, PharmD, MPH  Clinical Specialty Pharmacist, Endocrinology  1/10/2025  10:46 EST

## 2025-01-10 NOTE — TELEPHONE ENCOUNTER
Care discussed with patient over the phone.  Patient reports that he is currently taking Trulicity 4.5 mg once a week.  There was a lapse in insurance coverage but patient have a very short period of time around 2 weeks when he was off medication.  Will refill prescription for Trulicity 4.5.

## 2025-01-10 NOTE — PROGRESS NOTES
Specialty Pharmacy Patient Management Program  Endocrinology Medication Addition Assessment     Jeancarlos Zheng was referred by an Endocrinology provider to the Endocrinology Patient Management Program offered by [facility] for Type 2 Diabetes. This assessment was conducted as a result of a specialty medication addition or substitution. The patient was previously introduced to services offered by [facility], including: regular assessments, refill coordination, curbside pick-up or mail order delivery options, prior authorization maintenance, and financial assistance programs as applicable. The patient was also provided with contact information for the pharmacy team.      An initial outreach was conducted, including assessment of therapy appropriateness and specialty medication education for Trulicity.     A follow-up outreach was conducted, including assessment of continued therapy appropriateness, medication adherence, and side effect incidence and management for Insulin Glargine.    Insurance Coverage & Financial Support  Sidecar (New 2025)     Relevant Past Medical History and Comorbidities  Relevant medical history and concomitant health conditions were discussed with the patient. The patient's chart has been reviewed for relevant past medical history and comorbid conditions and updated as necessary.  Past Medical History:   Diagnosis Date    Anxiety     Diabetes mellitus July     Social History     Socioeconomic History    Marital status:    Tobacco Use    Smoking status: Never     Passive exposure: Never    Smokeless tobacco: Never   Substance and Sexual Activity    Alcohol use: Yes     Alcohol/week: 40.0 standard drinks of alcohol     Types: 10 Cans of beer, 30 Shots of liquor per week     Comment: PT STATES THIS WEEKLY    Drug use: Yes     Types: Marijuana    Sexual activity: Yes     Partners: Female       Problem list reviewed by Jone  Harvey HERNANDEZ PharmD on 1/10/2025 at 11:05 AM    Hospitalizations and Urgent Care Since Last Assessment  ED Visits, Admissions, or Hospitalizations: None  Urgent Office Visits: None    Allergies  Known allergies and reactions were discussed with the patient. The patient's chart has been reviewed for  allergy information and updated as necessary.   No Known Allergies    Allergies reviewed by Harvey Becerril PharmD on 1/10/2025 at 11:05 AM    Relevant Laboratory Values  Relevant laboratory values were discussed with the patient. The following specialty medication dose adjustment(s) are recommended: Phone Med Addition Assessment - Enrolling Trulicity 4.5mg weekly in  at this time.  Last A1c obtained 7/2024 met goal of <7%.  Will continue to follow.     A1C Last 3 Results          1/24/2024    07:51 7/26/2024    08:51   HGBA1C Last 3 Results   Hemoglobin A1C 6.20  5.70      Lab Results   Component Value Date    HGBA1C 5.70 (H) 07/26/2024     Lab Results   Component Value Date    GLUCOSE 105 (H) 07/26/2024    CALCIUM 9.8 07/26/2024     07/26/2024    K 4.6 07/26/2024    CO2 29.1 (H) 07/26/2024     07/26/2024    BUN 16 07/26/2024    CREATININE 0.82 07/26/2024    EGFRIFAFRI 137 09/15/2021    EGFRIFNONA 113 09/15/2021    BCR 19.5 07/26/2024    ANIONGAP 7.5 01/24/2024     Lab Results   Component Value Date    CHLPL 151 07/26/2024    TRIG 99 07/26/2024    HDL 49 07/26/2024    LDL 84 07/26/2024         Current Medication List  This medication list has been reviewed with the patient and evaluated for any interactions or necessary modifications/recommendations, and updated to include all prescription medications, OTC medications, and supplements the patient is currently taking.  This list reflects what is contained in the patient's profile, which has also been marked as reviewed to communicate to other providers it is the most up to date version of the patient's current medication therapy.     Current Outpatient  Medications:     Dulaglutide 4.5 MG/0.5ML solution auto-injector, Inject 4.5 mg under the skin into the appropriate area as directed 1 (One) Time Per Week., Disp: 6 mL, Rfl: 1    Accu-Chek Softclix Lancets lancets, Use as instructed, Disp: 100 each, Rfl: 12    Blood Glucose Monitoring Suppl (Accu-Chek Guide) w/Device kit, Use 1 each Daily., Disp: 1 kit, Rfl: 0    busPIRone (BUSPAR) 10 MG tablet, Take 1 tablet by mouth Daily., Disp: 90 tablet, Rfl: 3    glucose blood (Accu-Chek Guide) test strip, 1 each by Other route 4 (Four) Times a Day. / DX E11.65, Disp: 400 each, Rfl: 1    glucose blood test strip, Use as instructed. Check blood sugar daily. Prescribe to pt what insurance will cover. DX:E11.9, Disp: 100 each, Rfl: 12    glucose monitor monitoring kit, Use as instructed. Check blood sugar daily. Prescribe to pt what insurance will cover. DX: E11.9, Disp: 1 each, Rfl: 0    Insulin Glargine-yfgn 100 UNIT/ML solution pen-injector, Inject 10 Units under the skin into the appropriate area as directed Daily., Disp: 15 mL, Rfl: 1    Insulin Pen Needle (BD Pen Needle Pratima 2nd Gen) 32G X 4 MM misc, Use 1 each Daily., Disp: 100 each, Rfl: 1    Lancets (onetouch ultrasoft) lancets, 1 each by Other route 4 (Four) Times a Day. Please prescribe to pt what insurance will cover. DX E11.9, Disp: 400 each, Rfl: 1    omeprazole (priLOSEC) 40 MG capsule, Take 1 capsule by mouth Daily., Disp: 90 capsule, Rfl: 3    tadalafil (Cialis) 5 MG tablet, Take 1 tablet by mouth Daily As Needed for Erectile Dysfunction., Disp: 30 tablet, Rfl: 5    Medicines reviewed by Harvey Becerril, PharmD on 1/10/2025 at 11:05 AM    Drug Interactions  No Clinically Significant DDIs Were Identified at Present Time Upon Marking Medications Reviewed    Recommended Medications Assessment  Aspirin: Not Taking Currently  Statin: Not Taking Currently  ACEi/ARB: Not Taking Currently    Initial Education Provided for Specialty Medication  The patient has been  provided with the following education and any applicable administration techniques (i.e. self-injection) have been demonstrated for the therapies indicated. All questions and concerns have been addressed prior to the patient receiving the medication, and the patient has verbalized comprehension of the education and any materials provided. Additional patient education shall be provided and documented upon request by the patient, provider, or payer.        TRULICITY® (dulaglutide)  Medication Expectations   Why am I taking this medication? You are taking Trulicity, along with diet and exercise, to lower blood sugar because you have type 2 diabetes. Diabetes is not curable but with proper medication and treatment, you can keep your blood sugar within your personalized target range. This medication may also reduce the risk of heart attack or stroke   What should I expect while on this medication? You should expect to see your blood sugar and A1c decrease over time and you may also lose some weight.   How does the medication work? Trulicity is a non-insulin injection that works with your body's own ability to lower blood sugar and A1c and helps your body release its own insulin in response to your blood sugar rising.  This medication also slows down food from leaving your stomach, making you feel batista for longer.   How long will I be on this medication for? The amount of time you will be on this medication will be determined by your doctor based on blood sugar and A1c control. You will most likely be on this medication or another diabetes medication throughout your lifetime. Do not abruptly stop this medication without talking to your doctor first.    How do I take this medication? Take as directed on your prescription label. Trulicity is supplied in a single-use pen for each dose and you will use a new pre-filled pen each week.  It is injected under the skin (subcutaneously) of your stomach, thigh or upper arm. Use  this medication once weekly, on the same day each week, with or without food.      What are some possible side effects? In addition to decreased appetite, the most common side effects are nausea and constipation.  Redness, itching, and/or swelling can occur where the shot was given. Hypoglycemia can occur, especially if you are taking Trulicity with other medications that cause low blood sugar.    What happens if I miss a dose? If you miss a dose, take it as soon as you remember as long as there are at least 3 days until the next scheduled dose.  If there are less than 3 days, skip the missed dose and resume Trulicity on the regularly scheduled day.  Do not take 2 doses at the same time or extra doses.     Medication Safety   What are things I should warn my doctor immediately about? Do not use Trulicity if you or a family member have ever had medullary thyroid cancer (MTC) or Multiple Endocrine Neoplasia syndrome type 2 (MEN 2).  Tell your doctor if you have or have had problems with your kidneys or pancreas. Stop using Trulicity and get medical help right away if you have severe pain in your stomach area that will not go away. Talk to your doctor if you are pregnant, planning to become pregnant, or breastfeeding. Get medical help right away if you notice any signs/symptoms of an allergic reaction (rash, hives, difficulty breathing, etc.).   What are things that I should be cautious of? Be cautious of any side effects from this medication. Talk to your doctor if any new ones develop or aren't getting better.   What are some medications that can interact with this one? Taking Trulicity with other medications that also lower your blood sugar such as insulin and glipizide/glimepiride/glyburide may increase the risk of low blood sugar. Your doctor may reduce the dose of these medications when you start Trulicity. Always tell your doctor or pharmacist immediately if you start taking any new medications, including  over-the-counter medications, vitamins, and herbal supplements.      Medication Storage/Handling   How should I handle this medication? Keep this medication out of reach of pets/children and keep the pen capped   How does this medication need to be stored? Store Trulicity in the refrigerator. Do not freeze or use Trulicity that has been frozen.  You may store your Trulicity pens at room temperature for up to 14 days.     How should I dispose of this medication? Used Trulicity pens should discarded after each use (for single use only). If your doctor decides to stop this medication, take to your local police station for proper disposal. Some pharmacies also have take-back bins for medication drop-off.     Resources/Support   How can I remind myself to take this medication? You can download reminder apps to help you manage your refills, or set an alarm on your phone to remind you.    Is financial support available?  [employer] can provide co-pay cards if you have commercial insurance or patient assistance if you have Medicare or no insurance.    Which vaccines are recommended for me? Talk to your doctor about these vaccines: Flu, Coronavirus (COVID-19), Pneumococcal (pneumonia), Tdap, Hepatitis B, Zoster (shingles)             Adherence, Self-Administration, and Current Therapy Problems  Adherence related to the patient's specialty therapy was discussed with the patient. The Adherence segment of this outreach has been reviewed and updated.     Is there a concern with patient's ability to self administer the medication correctly and without issue?: No  Were any potential barriers to adherence identified during the initial assessment or patient education?: No  Are there any concerns regarding the patient's understanding of the importance of medication adherence?: No    Adherence Questions  Linked Medication(s) Assessed: Insulin Glargine-yfgn  On average, how many doses/injections does the patient miss per month?: 0  What are  the identified reasons for non-adherence or missed doses? : no problems identified  What is the estimated medication adherence level?: %  Based on the patient/caregiver response and refill history, does this patient require an MTP to track adherence improvements?: no    Additional Barriers to Patient Self-Administration: No known barriers at this time  Methods for Supporting Patient Self-Administration: Continued  enrollment    Open Medication Therapy Problems  No medication therapy recommendations to display    Adverse Drug Reactions  Medication tolerability: Tolerating with no to minimal ADRs  Medication plan: Continue therapy with normal follow-up  Plan for ADR Management: N/A at this time.  Pt reports they are tolerating therapy well.    Goals of Therapy  Goals related to the patient's specialty therapy were discussed with the patient. The Patient Goals segment of this outreach has been reviewed and updated.   Goals Addressed Today        Specialty Pharmacy General Goal      Maintain A1c <7%      Lab Results    Component Value Date     Phone Med Addition Assessment  01/10/2025 Phone Med Addition Assessment - Enrolling Trulicity 4.5mg weekly in  at this time.  Last A1c obtained 7/2024 met goal of <7%.  Will continue to follow.  CR    Phone Initial Assessment  11/05/2024 Phone Initial Assessment - Starting Semglee 10u daily at this time, as pt recently changed prescription insurance plan and Trulicity + all other GLP-1 RA (including Rybelsus) and DPP-4 options are now unaffordable. Will follow and titrate as needed.  CR    HGBA1C 5.70 (H) 07/26/2024     HGBA1C 6.20 (H) 01/24/2024     HGBA1C 6.60 (H) 10/06/2023     HGBA1C 9.5 (H) 08/25/2023     HGBA1C 11.40 (H) 07/07/2023                      Quality of Life Assessment   Quality of Life related to the patient's enrollment in the patient management program and services provided was discussed with the patient. The QOL segment of this outreach has been  reviewed and updated.    Quality of Life Improvement Scale: 9-A good deal better    Reassessment Plan & Follow-Up  1. Medication Therapy Changes: Phone Med Addition Assessment - Enrolling Trulicity 4.5mg weekly in  at this time.  Last A1c obtained 7/2024 met goal of <7%.  Will continue to follow.   2. Related Plans, Therapy Recommendations, or Therapy Problems to Be Addressed: None  3. Pharmacist to perform regular assessments no more than (6) months from the previous assessment.  4. Care Coordinator to set up future refill outreaches, coordinate prescription delivery, and escalate clinical questions to pharmacist.    Attestation  Therapeutic appropriateness: Appropriate   I attest the patient was actively involved in and has agreed to the above plan of care. If the prescribed therapy is at any point deemed not appropriate based on the current or future assessments, a consultation will be initiated with the patient's specialty care provider to determine the best course of action. The revised plan of therapy will be documented along with any required assessments and/or additional patient education provided.       Harvey Becerril, PharmD, MPH  Clinical Specialty Pharmacist, Endocrinology  1/10/2025  11:20 EST    Discussed the aforementioned information with the patient via Telephone.

## 2025-01-23 ENCOUNTER — SPECIALTY PHARMACY (OUTPATIENT)
Age: 45
End: 2025-01-23
Payer: COMMERCIAL

## 2025-01-23 NOTE — PROGRESS NOTES
Specialty Pharmacy Refill Coordination Note     Jeancarlos is a 44 y.o. male contacted today regarding refills of his specialty medication(s).    Specialty medication(s) and dose(s) confirmed: Yes  Changes to medications: no  Changes to insurance: no  Reviewed and verified with patient:         Refill Questions      Flowsheet Row Most Recent Value   Changes to allergies? No   Changes to medications? No   New conditions or infections since last clinic visit No   Unplanned office visit, urgent care, ED, or hospital admission in the last 4 weeks  No   How does patient/caregiver feel medication is working? Good   Financial problems or insurance changes  No   Since the previous refill, were any specialty medication doses or scheduled injections missed or delayed?  No   Does this patient require a clinical escalation to a pharmacist? No            Delivery Questions      Flowsheet Row Most Recent Value   Delivery method [employer]   Delivery address verified with patient/caregiver? Yes   Delivery address Home   Medication(s) being filled and delivered Dulaglutide   Copay verified? Yes   Copay amount $25.00   Copay form of payment Credit/debit on file   Ship Date 01/27/2025   Delivery Date Selection 01/28/25   Signature Required No                 Follow-up: 3 month(s)     Kimberlyn Curtis, Pharmacy Technician  Specialty Pharmacy Technician   1/23/2025   14:25 EST

## 2025-02-27 RX ORDER — BUSPIRONE HYDROCHLORIDE 10 MG/1
10 TABLET ORAL DAILY
Qty: 90 TABLET | Refills: 3 | Status: SHIPPED | OUTPATIENT
Start: 2025-02-27

## 2025-02-27 NOTE — TELEPHONE ENCOUNTER
Rx Refill Note  Requested Prescriptions     Pending Prescriptions Disp Refills    busPIRone (BUSPAR) 10 MG tablet 90 tablet 3     Sig: Take 1 tablet by mouth Daily.      Last office visit with prescribing clinician: 7/26/2024   Last telemedicine visit with prescribing clinician: Visit date not found   Next office visit with prescribing clinician: 3/26/2025                         Would you like a call back once the refill request has been completed: [] Yes [] No    If the office needs to give you a call back, can they leave a voicemail: [] Yes [] No    Naseem Rangel MA  02/27/25, 16:11 EST

## 2025-03-17 ENCOUNTER — TELEPHONE (OUTPATIENT)
Dept: FAMILY MEDICINE CLINIC | Facility: CLINIC | Age: 45
End: 2025-03-17
Payer: COMMERCIAL

## 2025-03-20 ENCOUNTER — OFFICE VISIT (OUTPATIENT)
Dept: ENDOCRINOLOGY | Facility: CLINIC | Age: 45
End: 2025-03-20
Payer: COMMERCIAL

## 2025-03-20 ENCOUNTER — SPECIALTY PHARMACY (OUTPATIENT)
Dept: ENDOCRINOLOGY | Facility: CLINIC | Age: 45
End: 2025-03-20
Payer: COMMERCIAL

## 2025-03-20 VITALS
WEIGHT: 240 LBS | BODY MASS INDEX: 32.51 KG/M2 | OXYGEN SATURATION: 97 % | DIASTOLIC BLOOD PRESSURE: 78 MMHG | HEART RATE: 77 BPM | SYSTOLIC BLOOD PRESSURE: 136 MMHG | HEIGHT: 72 IN

## 2025-03-20 DIAGNOSIS — E11.9 TYPE 2 DIABETES MELLITUS WITHOUT COMPLICATION, WITHOUT LONG-TERM CURRENT USE OF INSULIN: Primary | ICD-10-CM

## 2025-03-20 DIAGNOSIS — E66.9 OBESITY (BMI 30-39.9): ICD-10-CM

## 2025-03-20 DIAGNOSIS — N52.9 ERECTILE DYSFUNCTION, UNSPECIFIED ERECTILE DYSFUNCTION TYPE: ICD-10-CM

## 2025-03-20 RX ORDER — TADALAFIL 5 MG/1
5 TABLET ORAL DAILY PRN
Qty: 30 TABLET | Refills: 5 | Status: SHIPPED | OUTPATIENT
Start: 2025-03-20

## 2025-03-20 NOTE — PROGRESS NOTES
-----------------------------------------------------------------  ENDOCRINE CLINIC NOTE  -----------------------------------------------------------------        PATIENT NAME: Jeancarlos Zheng  PATIENT : 1980 AGE: 44 y.o.  MRN NUMBER: [id number]  PRIMARY CARE: Epley, James, APRN    ==========================================================================    CHIEF COMPLAINT: T2DM  DATE OF SERVICE: 25    ==========================================================================    HPI / SUBJECTIVE    44 y.o. male is seen in the clinic today for follow up of T2DM.  Diagnosis Date: Aug 2023  Current Therapy / Medications:  Trulicity 4.5 mg once a week  Past tried medications: (Not currently using)  Metformin ER, unable to tolerate due to GI side effects  Glimepiride due to controlled T2DM and concerns for hypoglycemia  Insulin was ordered but not using at this time  Intermittent BG monitoring and reviewed over the phone  Three meals a day  Reports that he have gained weight  No eye exam yet  Denied any numbness or tingling, denies any open wounds  No cathy CKD  Lifetime non-smoker  Last lipid panel: 2025, not currently on any statin therapy  No hx of CAD or CVA    Erectile Dysfunction: On Cialis therapy    ==========================================================================                                                PAST MEDICAL HISTORY    Past Medical History:   Diagnosis Date    Anxiety     Diabetes mellitus July    Type 2 diabetes mellitus without complication, without long-term current use of insulin 3       ==========================================================================    PAST SURGICAL HISTORY    History reviewed. No pertinent surgical history.    ==========================================================================    FAMILY HISTORY    Family History   Problem Relation Age of Onset    Depression Mother     Cancer Mother     Colon cancer Maternal Grandmother      Diabetes Paternal Grandfather     Colon polyps Neg Hx     Crohn's disease Neg Hx     Irritable bowel syndrome Neg Hx     Ulcerative colitis Neg Hx        ==========================================================================    SOCIAL HISTORY    Social History     Socioeconomic History    Marital status:    Tobacco Use    Smoking status: Never     Passive exposure: Never    Smokeless tobacco: Never   Substance and Sexual Activity    Alcohol use: Yes     Alcohol/week: 40.0 standard drinks of alcohol     Types: 10 Cans of beer, 30 Shots of liquor per week     Comment: PT STATES THIS WEEKLY    Drug use: Yes     Types: Marijuana    Sexual activity: Yes     Partners: Female       ==========================================================================    MEDICATIONS      Current Outpatient Medications:     Accu-Chek Softclix Lancets lancets, Use as instructed, Disp: 100 each, Rfl: 12    Blood Glucose Monitoring Suppl (Accu-Chek Guide) w/Device kit, Use 1 each Daily., Disp: 1 kit, Rfl: 0    busPIRone (BUSPAR) 10 MG tablet, Take 1 tablet by mouth Daily., Disp: 90 tablet, Rfl: 3    Dulaglutide 4.5 MG/0.5ML solution auto-injector, Inject 4.5 mg under the skin into the appropriate area as directed 1 (One) Time Per Week., Disp: 6 mL, Rfl: 1    glucose blood (Accu-Chek Guide) test strip, 1 each by Other route 4 (Four) Times a Day. / DX E11.65, Disp: 400 each, Rfl: 1    glucose blood test strip, Use as instructed. Check blood sugar daily. Prescribe to pt what insurance will cover. DX:E11.9, Disp: 100 each, Rfl: 12    glucose monitor monitoring kit, Use as instructed. Check blood sugar daily. Prescribe to pt what insurance will cover. DX: E11.9, Disp: 1 each, Rfl: 0    Insulin Pen Needle (BD Pen Needle Pratima 2nd Gen) 32G X 4 MM misc, Use 1 each Daily., Disp: 100 each, Rfl: 1    Lancets (onetouch ultrasoft) lancets, 1 each by Other route 4 (Four) Times a Day. Please prescribe to pt what insurance will cover. DX  E11.9, Disp: 400 each, Rfl: 1    omeprazole (priLOSEC) 40 MG capsule, Take 1 capsule by mouth Daily., Disp: 90 capsule, Rfl: 3    tadalafil (Cialis) 5 MG tablet, Take 1 tablet by mouth Daily As Needed for Erectile Dysfunction., Disp: 30 tablet, Rfl: 5    ==========================================================================    ALLERGIES    No Known Allergies    ==========================================================================    OBJECTIVE    Vitals:    03/20/25 0755   BP: 136/78   Pulse: 77   SpO2: 97%       Body mass index is 32.54 kg/m².     General: Alert, cooperative, no acute distress    ==========================================================================    LAB EVALUATION    Lab Results   Component Value Date    GLUCOSE 114 (H) 03/19/2025    BUN 13 03/19/2025    CREATININE 0.75 (L) 03/19/2025    EGFRIFNONA 113 09/15/2021    EGFRIFAFRI 137 09/15/2021    BCR 17.3 03/19/2025    K 4.0 03/19/2025    CO2 29.4 (H) 03/19/2025    CALCIUM 9.1 03/19/2025    ALBUMIN 3.9 03/19/2025    AST 28 03/19/2025    ALT 32 03/19/2025    TRIG 81 03/19/2025    HDL 30 (L) 03/19/2025    LDL 49 03/19/2025     Lab Results   Component Value Date    HGBA1C 6.10 (H) 03/19/2025    HGBA1C 5.70 (H) 07/26/2024    HGBA1C 6.20 (H) 01/24/2024     Lab Results   Component Value Date    CREATININE 0.75 (L) 03/19/2025     Lab Results   Component Value Date    TSH 1.630 07/26/2024       ==========================================================================    ASSESSMENT AND PLAN    # Type 2 Diabetes Mellitus, controled on medication  - Reviewed patient blood work A1c continues to be within normal limits  - Tolerating medication without any side effect  - There was some issues with insurance coverage and patient was placed on insulin therapy for a short time but patient have now been the insurance back and is back on Trulicity therapy, again tolerating medication without any side effects  - Patient to continue checking blood sugar  "2-3 times a week  - Therapy will stay as:  Trulicity 4.5 mg once a week -Thursdays  - Referral for diabetic eye exam has already been sent and patient have received call for scheduling appointment but due to social issues was not able to, counseled patient about importance of diabetic eye exam  - Repeat blood work before next visit    # Obesity with BMI of 32.54    # Erectile dysfunction, Cialis 5 mg once a day    Return to clinic: 6 months    Entire assessment and plan was discussed and counseled the patient in detail to which patient verbalized understanding and agreed with care.  Answered all queries and concerns.    This note was created using voice recognition software and is inherently subject to errors including those of syntax and \"sound-alike\" substitutions which may escape proofreading.  In such instances, original meaning may be extrapolated by contextual derivation.    Note: Portions of this note may have been copied from previous notes but documentation have been reviewed and edited as necessary to support clinical decision making for today's visit.    ==========================================================================    INFORMATION PROVIDED TO PATIENT    Patient Instructions   Please,    - Continue Trulicity 4.5 mg once a week.    Check your blood sugar 2-3 times a week.  Please maintain the log of your blood sugars.  Please make appointment for diabetic eye exam.    Please repeat blood work before next visit, non-fasting.    Thank you for your visit today.    If you have any questions or concerns please feel free to reach out of the office.       ==========================================================================  Taz Casey MD  Department of Endocrine, Diabetes and Metabolism  [facility], [place]  ==========================================================================  "

## 2025-03-20 NOTE — PATIENT INSTRUCTIONS
Please,    - Continue Trulicity 4.5 mg once a week.    Check your blood sugar 2-3 times a week.  Please maintain the log of your blood sugars.  Please make appointment for diabetic eye exam.    Please repeat blood work before next visit, non-fasting.    Thank you for your visit today.    If you have any questions or concerns please feel free to reach out of the office.

## 2025-03-26 ENCOUNTER — OFFICE VISIT (OUTPATIENT)
Dept: FAMILY MEDICINE CLINIC | Facility: CLINIC | Age: 45
End: 2025-03-26
Payer: COMMERCIAL

## 2025-03-26 VITALS
RESPIRATION RATE: 15 BRPM | DIASTOLIC BLOOD PRESSURE: 85 MMHG | BODY MASS INDEX: 33.47 KG/M2 | HEIGHT: 72 IN | OXYGEN SATURATION: 97 % | HEART RATE: 78 BPM | SYSTOLIC BLOOD PRESSURE: 125 MMHG | WEIGHT: 247.1 LBS | TEMPERATURE: 97.8 F

## 2025-03-26 DIAGNOSIS — Z80.0 FAMILY HISTORY OF COLON CANCER: ICD-10-CM

## 2025-03-26 DIAGNOSIS — E66.9 OBESITY (BMI 30-39.9): ICD-10-CM

## 2025-03-26 DIAGNOSIS — E11.65 TYPE 2 DIABETES MELLITUS WITH HYPERGLYCEMIA, WITHOUT LONG-TERM CURRENT USE OF INSULIN: ICD-10-CM

## 2025-03-26 DIAGNOSIS — Z00.00 HEALTH MAINTENANCE EXAMINATION: Primary | ICD-10-CM

## 2025-03-26 PROCEDURE — 99396 PREV VISIT EST AGE 40-64: CPT | Performed by: NURSE PRACTITIONER

## 2025-03-26 RX ORDER — DULAGLUTIDE 4.5 MG/.5ML
4.5 INJECTION, SOLUTION SUBCUTANEOUS WEEKLY
Qty: 6 ML | Refills: 1 | Status: CANCELLED | OUTPATIENT
Start: 2025-03-26

## 2025-03-26 RX ORDER — BUSPIRONE HYDROCHLORIDE 10 MG/1
10 TABLET ORAL DAILY
Qty: 90 TABLET | Refills: 3 | Status: SHIPPED | OUTPATIENT
Start: 2025-03-26

## 2025-03-26 RX ORDER — TADALAFIL 5 MG/1
5 TABLET ORAL DAILY
Qty: 90 TABLET | Refills: 3 | Status: SHIPPED | OUTPATIENT
Start: 2025-03-26

## 2025-03-26 NOTE — PROGRESS NOTES
"Chief Complaint  Annual Exam (No new concerns )    Subjective        Jeancarlos Zheng presents to [facility]  History of Present Illness  Pleasant gentleman here today for health maintenance and follow-up, he is doing well, diabetes mellitus type 2 controlled, he has had some weight gain, but recently has a new baby, 6-month-old,  And his insurance changed and he was out of Trulicity for about 2 to 3 months, had insulin intermittently for a time, picked up weight but he pretty much feels like he eats the same does not know why he picked up the weight, but he is motivated for his health, and is improved his metabolic situation substantially over the last couple years  Social history as above no drug alcohol or tobacco abuse,  Past medical history as above,  Family history no change no prostate or colon cancer      Objective   Vital Signs:  /85 (BP Location: Left arm, Patient Position: Sitting, Cuff Size: Adult)   Pulse 78   Temp 97.8 °F (36.6 °C) (Oral)   Resp 15   Ht 182.9 cm (72.01\")   Wt 112 kg (247 lb 1.6 oz)   SpO2 97%   BMI 33.51 kg/m²   Estimated body mass index is 33.51 kg/m² as calculated from the following:    Height as of this encounter: 182.9 cm (72.01\").    Weight as of this encounter: 112 kg (247 lb 1.6 oz).          Physical Exam  Vitals reviewed.   Constitutional:       General: He is not in acute distress.     Appearance: Normal appearance. He is well-developed. He is obese. He is not ill-appearing or diaphoretic.   HENT:      Head: Normocephalic and atraumatic.      Nose: Nose normal.      Mouth/Throat:      Mouth: Mucous membranes are moist.   Eyes:      General: No scleral icterus.     Conjunctiva/sclera: Conjunctivae normal.      Pupils: Pupils are equal, round, and reactive to light.   Neck:      Thyroid: No thyromegaly.      Vascular: No JVD.   Cardiovascular:      Rate and Rhythm: Normal rate and regular rhythm.      Heart sounds: Normal heart sounds. " No murmur heard.     No friction rub. No gallop.   Pulmonary:      Effort: Pulmonary effort is normal. No respiratory distress.      Breath sounds: Normal breath sounds. No stridor. No wheezing or rales.   Abdominal:      General: Bowel sounds are normal. There is no distension.      Palpations: Abdomen is soft. There is no mass.      Tenderness: There is no abdominal tenderness. There is no guarding.      Hernia: No hernia is present.      Comments: No hepatosplenomegaly, no ascites,   Musculoskeletal:         General: No tenderness. Normal range of motion.      Cervical back: Neck supple.   Lymphadenopathy:      Cervical: No cervical adenopathy.   Skin:     General: Skin is warm and dry.      Findings: No erythema or rash.   Neurological:      General: No focal deficit present.      Mental Status: He is alert and oriented to person, place, and time. Mental status is at baseline.      Deep Tendon Reflexes: Reflexes are normal and symmetric.   Psychiatric:         Behavior: Behavior normal.         Thought Content: Thought content normal.         Judgment: Judgment normal.      Result Review :                Assessment and Plan   Diagnoses and all orders for this visit:    1. Type 2 diabetes mellitus with hyperglycemia, without long-term current use of insulin (Primary)    2. Health maintenance examination    Other orders  -     busPIRone (BUSPAR) 10 MG tablet; Take 1 tablet by mouth Daily.  Dispense: 90 tablet; Refill: 3  -     tadalafil (Cialis) 5 MG tablet; Take 1 tablet by mouth Daily.  Dispense: 90 tablet; Refill: 3             Follow Up   No follow-ups on file.  Patient was given instructions and counseling regarding his condition or for health maintenance advice. Please see specific information pulled into the AVS if appropriate.     There are no Patient Instructions on file for this visit.

## 2025-03-26 NOTE — PATIENT INSTRUCTIONS
Discharge instructions    Review and refresher self regarding American diabetes Association website cooking tips and healthy eating, calories around 1500 rox no more than 1800 a day or so for a temporary time start counting calories, and get back into the swing of your healthy diet, just some modest but consistent changes can go a long way,    As well as you will start to walk more and get out more with your baby, family, and this will improve your diet improve your health  Challenge you,  20 pounds over the next 1 year   Get back into the grove , and the positive reinforcement      For the rash behind your knees, try hydrocortisone cream 1% OTC very thin layer wipe off excess keep dry for couple weeks if not improved then add antifungal such as generic Lamisil  For a couple weeks    Slow steady changes to improve your metabolic status  Colonoscopy call and verify with gastro when you need your next otherwise is likely 2028 but please verify

## 2025-04-07 ENCOUNTER — SPECIALTY PHARMACY (OUTPATIENT)
Dept: ENDOCRINOLOGY | Facility: CLINIC | Age: 45
End: 2025-04-07
Payer: COMMERCIAL

## 2025-04-07 ENCOUNTER — TELEPHONE (OUTPATIENT)
Dept: ENDOCRINOLOGY | Facility: CLINIC | Age: 45
End: 2025-04-07
Payer: COMMERCIAL

## 2025-04-07 NOTE — TELEPHONE ENCOUNTER
Specialty Pharmacy Patient Management Program       Jeancarlos Zheng is a 44 y.o. male seen by an Endocrinology provider for Type 2 Diabetes and enrolled in the Endocrinology Patient Management program offered by [facility].      Requested Prescriptions     Pending Prescriptions Disp Refills    glucose blood (Accu-Chek Guide) test strip 400 each 1     Si each by Other route 4 (Four) Times a Day. / DX E11.65       Refill sent in to patient's pharmacy for above medication prescribed per telephone order by Dr. Casey.   Last office visit 3/20/2025.  Next visit scheduled 2025.      Harvey Becerril, PharmD, MPH  Clinical Specialty Pharmacist, Endocrinology  2025  12:54 EDT

## 2025-04-07 NOTE — PROGRESS NOTES
Specialty Pharmacy Patient Management Program  Endocrinology Refill Outreach      Jeancarlos is a 44 y.o. male contacted today regarding refills of his medication(s).    Specialty medication(s) and dose(s) confirmed: Trulicity    Refill Questions      Flowsheet Row Most Recent Value   Changes to allergies? No   Changes to medications? No   New conditions or infections since last clinic visit No   Unplanned office visit, urgent care, ED, or hospital admission in the last 4 weeks  No   How does patient/caregiver feel medication is working? Good   Financial problems or insurance changes  No   Since the previous refill, were any specialty medication doses or scheduled injections missed or delayed?  No   Does this patient require a clinical escalation to a pharmacist? No          Delivery Questions      Flowsheet Row Most Recent Value   Delivery method UPS   Delivery address verified with patient/caregiver? Yes   Delivery address Home   Number of medications in delivery 2   Medication(s) being filled and delivered Dulaglutide, Glucose Blood   Copay verified? Yes   Copay amount $64.02   Copay form of payment Credit/debit on file   Delivery Date Selection 04/08/25   Signature Required No   Do you consent to receive electronic handouts?  No            Follow-Up: 3 months    Kimberlyn Curtis CPHT  Clinical Specialty Pharmacy Technician  4/7/2025  14:33 EDT

## 2025-04-21 ENCOUNTER — SPECIALTY PHARMACY (OUTPATIENT)
Dept: ENDOCRINOLOGY | Facility: CLINIC | Age: 45
End: 2025-04-21
Payer: COMMERCIAL

## 2025-04-21 NOTE — PROGRESS NOTES
Specialty Pharmacy Patient Management Program  One-Time Clinical Outreach     Jeancarlos Zheng is a 44 y.o. male seen by an Endocrinology provider for Type 2 Diabetes and enrolled in the Endocrinology Patient Management program offered by [facility].      Pt no longer takes Semglee.  Now back on Trulicity therapy after obtaining new prescription insurance coverage.  Will continue to follow and manage Trulicity through .      Harvey Becerril, PharmD, MPH  Clinical Specialty Pharmacist, Endocrinology  4/21/2025  12:52 EDT

## 2025-06-03 RX ORDER — OMEPRAZOLE 40 MG/1
40 CAPSULE, DELAYED RELEASE ORAL DAILY
Qty: 90 CAPSULE | Refills: 3 | Status: SHIPPED | OUTPATIENT
Start: 2025-06-03

## 2025-06-03 RX ORDER — OMEPRAZOLE 40 MG/1
40 CAPSULE, DELAYED RELEASE ORAL DAILY
Qty: 90 CAPSULE | Refills: 3 | Status: CANCELLED | OUTPATIENT
Start: 2025-06-03

## 2025-06-03 NOTE — TELEPHONE ENCOUNTER
Caller: Jeancarlos Zheng MATI    Relationship: Self    Best call back number:     [phone] (Mobile)       Requested Prescriptions:   Requested Prescriptions     Pending Prescriptions Disp Refills    omeprazole (priLOSEC) 40 MG capsule 90 capsule 3     Sig: Take 1 capsule by mouth Daily.        Pharmacy where request should be sent: [facility])     Last office visit with prescribing clinician: 3/26/2025   Last telemedicine visit with prescribing clinician: Visit date not found   Next office visit with prescribing clinician: 3/31/2026     Additional details provided by patient: PATIENT HAS 3 DAYS     Does the patient have less than a 3 day supply:  [x] Yes  [] No    Would you like a call back once the refill request has been completed: [] Yes [x] No    If the office needs to give you a call back, can they leave a voicemail: [] Yes [x] No    Red Choi Rep   06/03/25 15:48 EDT

## 2025-06-23 ENCOUNTER — SPECIALTY PHARMACY (OUTPATIENT)
Dept: ENDOCRINOLOGY | Facility: CLINIC | Age: 45
End: 2025-06-23
Payer: COMMERCIAL

## 2025-06-23 NOTE — PROGRESS NOTES
Specialty Pharmacy Patient Management Program  Refill Coordination Outreach      Jeancarlos is a 44 y.o. male contacted today regarding refills of his medication(s).    Specialty medication(s) and dose(s) confirmed: Trulicity 4.5 mg injections  Other medications being refilled: N/A    Refill Questions      Flowsheet Row Most Recent Value   Changes to allergies? No   Changes to medications? No   New conditions or infections since last clinic visit No   Unplanned office visit, urgent care, ED, or hospital admission in the last 4 weeks  No   How does patient/caregiver feel medication is working? Very good   Financial problems or insurance changes  No   Since the previous refill, were any specialty medication doses or scheduled injections missed or delayed?  No   Does this patient require a clinical escalation to a pharmacist? No            Delivery Questions      Flowsheet Row Most Recent Value   Delivery method UPS   Delivery address verified with patient/caregiver? Yes   Delivery address Home   Number of medications in delivery 1   Medication(s) being filled and delivered Dulaglutide   Doses left of specialty medications 2 pens = 14 days   Copay verified? Yes   Copay amount $25.00   Copay form of payment Credit/debit on file   Delivery Date Selection 06/25/25   Signature Required No   Do you consent to receive electronic handouts?  --  [This was not discussed]                   Follow-up: 77 days     Sun Xiong, PharmD, BCPS  Clinical Specialty Pharmacist  6/23/2025  12:39 EDT

## 2025-06-23 NOTE — PROGRESS NOTES
Specialty Pharmacy Patient Management Program  Reassessment     Jeancarlos Zheng is a 44 y.o. male with type 2 diabetes and enrolled in the Patient Management program offered by [facility]. A follow-up outreach was conducted, including assessment of continued therapy appropriateness, medication adherence, and side effect incidence and management for Trulicity (dulaglutide) 4.5 mg injection.     Changes to Insurance Coverage or Financial Support  No noted changes    Relevant Past Medical History and Comorbidities  Relevant medical history and concomitant health conditions were discussed with the patient. The patient's chart has been reviewed for relevant past medical history and comorbid health conditions and updated as necessary.   Past Medical History:   Diagnosis Date    Anxiety     Diabetes mellitus July    Type 2 diabetes mellitus without complication, without long-term current use of insulin 3/20/2025     Social History     Socioeconomic History    Marital status:    Tobacco Use    Smoking status: Never     Passive exposure: Never    Smokeless tobacco: Never   Vaping Use    Vaping status: Former    Substances: THC    Devices: RefSpot formerly PlacePopble tank   Substance and Sexual Activity    Alcohol use: Yes     Alcohol/week: 40.0 standard drinks of alcohol     Types: 10 Cans of beer, 30 Shots of liquor per week     Comment: PT STATES THIS WEEKLY    Drug use: Yes     Frequency: 7.0 times per week     Types: Marijuana    Sexual activity: Yes     Partners: Female     Problem list reviewed by Sun Xiong RPH on 6/23/2025 at 12:37 PM    Allergies  Known allergies and reactions were discussed with the patient. The patient's chart has been reviewed for allergy information and updated as necessary.   No Known Allergies  Allergies reviewed by Sun Xiong RPH on 6/23/2025 at 12:37 PM    Relevant Laboratory Values  Lab Results   Component Value Date    GLUCOSE 114 (H) 03/19/2025    CALCIUM  9.1 03/19/2025     03/19/2025    K 4.0 03/19/2025    CO2 29.4 (H) 03/19/2025     03/19/2025    BUN 13 03/19/2025    CREATININE 0.75 (L) 03/19/2025    BCR 17.3 03/19/2025    ANIONGAP 7.5 01/24/2024     Lab Results   Component Value Date    WBC 4.55 03/19/2025    HGB 14.9 03/19/2025    HCT 43.4 03/19/2025    MCV 91.2 03/19/2025     03/19/2025     Lab Value Review  The above lab values have been reviewed; the following specialty medication(s) dose adjustment(s) are recommended: none.    Current Medication List  This medication list has been reviewed with the patient and evaluated for any interactions or necessary modifications/recommendations, and updated to include all prescription medications, OTC medications, and supplements the patient is currently taking. This list reflects what is contained in the patient's profile, which has also been marked as reviewed to communicate to other providers it is the most up to date version of the patient's current medication therapy.     Current Outpatient Medications:     Blood Glucose Monitoring Suppl (Accu-Chek Guide) w/Device kit, Use 1 each Daily., Disp: 1 kit, Rfl: 0    busPIRone (BUSPAR) 10 MG tablet, Take 1 tablet by mouth Daily., Disp: 90 tablet, Rfl: 3    Dulaglutide 4.5 MG/0.5ML solution auto-injector, Inject 4.5 mg under the skin into the appropriate area as directed 1 (One) Time Per Week., Disp: 6 mL, Rfl: 1    glucose blood test strip, Use as instructed. Check blood sugar daily. Prescribe to pt what insurance will cover. DX:E11.9, Disp: 100 each, Rfl: 12    glucose blood test strip, use 1 test strip  4 (Four) Times a Day, Disp: 400 each, Rfl: 1    glucose monitor monitoring kit, Use as instructed. Check blood sugar daily. Prescribe to pt what insurance will cover. DX: E11.9, Disp: 1 each, Rfl: 0    Insulin Pen Needle (BD Pen Needle Pratima 2nd Gen) 32G X 4 MM misc, Use 1 each Daily. (Patient not taking: Reported on 3/26/2025), Disp: 100 each, Rfl: 1     Lancets (onetouch ultrasoft) lancets, 1 each by Other route 4 (Four) Times a Day. Please prescribe to pt what insurance will cover. DX E11.9 (Patient not taking: Reported on 3/26/2025), Disp: 400 each, Rfl: 1    omeprazole (priLOSEC) 40 MG capsule, Take 1 capsule by mouth Daily., Disp: 90 capsule, Rfl: 3    tadalafil (Cialis) 5 MG tablet, Take 1 tablet by mouth Daily., Disp: 90 tablet, Rfl: 3  Medicines reviewed by Sun Xiong RP on 6/23/2025 at 12:37 PM    Drug Interactions  none     Adverse Drug Reactions  Medication tolerability: Tolerating with no to minimal ADRs  Medication plan: Continue therapy with normal follow-up  Plan for ADR Management: None reported    Hospitalizations and Urgent Care Since Last Assessment  Hospitalizations or Admissions: none  ED Visits: none  Urgent Office Visits: none     Adherence, Self-Administration, and Current Therapy Problems  Adherence related to the patient's specialty therapy was discussed with the patient. The Adherence segment of this outreach has been reviewed and updated.     Adherence Questions  Linked Medication(s) Assessed: Dulaglutide  On average, how many doses/injections does the patient miss per month?: 0  What are the identified reasons for non-adherence or missed doses? : no problems identified  What is the estimated medication adherence level?: %  Based on the patient/caregiver response and refill history, does this patient require an MTP to track adherence improvements?: no    Additional Barriers to Patient Self-Administration: None  Methods for Supporting Patient Self-Administration: N/A    Open Medication Therapy Problems  No medication therapy recommendations to display    Goals of Therapy  Goals related to the patient's specialty therapy were discussed with the patient. The Patient Goals segment of this outreach has been reviewed and updated.   Goals Addressed Today        Specialty Pharmacy General Goal      Maintain A1c <7%      Lab  Results    Component Value Date     Phone Reassessment  6/23/25 Patient is at goal. He reports doing well on the medication    HGBA1C 6.10 (H) 3/19/25     Phone Med Addition Assessment  01/10/2025 Phone Med Addition Assessment - Enrolling Trulicity 4.5mg weekly in  at this time.  Last A1c obtained 7/2024 met goal of <7%.  Will continue to follow.  CR    Phone Initial Assessment  11/05/2024 Phone Initial Assessment - Starting Semglee 10u daily at this time, as pt recently changed prescription insurance plan and Trulicity + all other GLP-1 RA (including Rybelsus) and DPP-4 options are now unaffordable. Will follow and titrate as needed.  CR    HGBA1C 5.70 (H) 07/26/2024     HGBA1C 6.20 (H) 01/24/2024     HGBA1C 6.60 (H) 10/06/2023     HGBA1C 9.5 (H) 08/25/2023     HGBA1C 11.40 (H) 07/07/2023                        Progress Toward Meeting Patient-Identified Goals of Therapy: On Track  New Patient-Identified Goals, If Applicable:     Progress Toward Meeting Clinical Goals or Therapeutic Targets: On Track  New Clinical Goals or Therapeutic Targets, If Applicable:     Quality of Life Assessment   Quality of Life related to the patient's enrollment in the patient management program and services provided was discussed with the patient. The QOL segment of this outreach has been reviewed and updated.  Quality of Life Improvement Scale: 8-Moderately better    Reassessment Plan & Follow-Up  Medication Therapy Changes: none  Additional Plans, Therapy Recommendations, or Therapy Problems to Be Addressed: none   Pharmacist to perform regular reassessments no more than (6) months from the previous assessment.  Care Coordinator to set up future refill outreaches, coordinate prescription delivery, and escalate clinical questions to pharmacist.     Attestation  I attest the patient was actively involved in and has agreed to the above plan of care. I attest that the specialty medication(s) addressed above are appropriate for the  patient based on my reassessment. If the prescribed therapy is at any point deemed not appropriate based on the current or future assessments, a consultation will be initiated with the patient's specialty care provider to determine the best course of action. The revised plan of therapy will be documented along with any required assessments and/or additional patient education provided.     Electronically signed by Sun Xiong RPH, 06/23/25, 12:35 PM EDT.

## 2025-08-01 ENCOUNTER — OFFICE VISIT (OUTPATIENT)
Dept: FAMILY MEDICINE CLINIC | Facility: CLINIC | Age: 45
End: 2025-08-01
Payer: COMMERCIAL

## 2025-08-01 VITALS
WEIGHT: 247 LBS | RESPIRATION RATE: 18 BRPM | BODY MASS INDEX: 33.46 KG/M2 | OXYGEN SATURATION: 97 % | HEART RATE: 117 BPM | DIASTOLIC BLOOD PRESSURE: 80 MMHG | SYSTOLIC BLOOD PRESSURE: 122 MMHG | HEIGHT: 72 IN

## 2025-08-01 DIAGNOSIS — R52 GENERALIZED BODY ACHES: ICD-10-CM

## 2025-08-01 DIAGNOSIS — E11.9 TYPE 2 DIABETES MELLITUS WITHOUT COMPLICATION, WITHOUT LONG-TERM CURRENT USE OF INSULIN: Primary | ICD-10-CM

## 2025-08-01 DIAGNOSIS — R25.2 MUSCLE CRAMPS: ICD-10-CM

## 2025-08-01 LAB
EXPIRATION DATE: NORMAL
FLUAV AG UPPER RESP QL IA.RAPID: NOT DETECTED
FLUBV AG UPPER RESP QL IA.RAPID: NOT DETECTED
INTERNAL CONTROL: NORMAL
Lab: NORMAL
SARS-COV-2 AG UPPER RESP QL IA.RAPID: NOT DETECTED

## 2025-08-01 PROCEDURE — 99214 OFFICE O/P EST MOD 30 MIN: CPT | Performed by: FAMILY MEDICINE

## 2025-08-01 PROCEDURE — 87428 SARSCOV & INF VIR A&B AG IA: CPT | Performed by: FAMILY MEDICINE

## 2025-08-01 RX ORDER — DICLOFENAC SODIUM 75 MG/1
75 TABLET, DELAYED RELEASE ORAL 2 TIMES DAILY
Qty: 60 TABLET | Refills: 12 | Status: SHIPPED | OUTPATIENT
Start: 2025-08-01

## 2025-08-02 LAB
ALBUMIN SERPL-MCNC: 4.6 G/DL (ref 3.5–5.2)
ALBUMIN/GLOB SERPL: 1.7 G/DL
ALP SERPL-CCNC: 75 U/L (ref 39–117)
ALT SERPL-CCNC: 26 U/L (ref 1–41)
AST SERPL-CCNC: 37 U/L (ref 1–40)
BASOPHILS # BLD AUTO: 0.03 10*3/MM3 (ref 0–0.2)
BASOPHILS NFR BLD AUTO: 0.5 % (ref 0–1.5)
BILIRUB SERPL-MCNC: 0.5 MG/DL (ref 0–1.2)
BUN SERPL-MCNC: 10 MG/DL (ref 6–20)
BUN/CREAT SERPL: 11.4 (ref 7–25)
CALCIUM SERPL-MCNC: 9.7 MG/DL (ref 8.6–10.5)
CHLORIDE SERPL-SCNC: 100 MMOL/L (ref 98–107)
CK SERPL-CCNC: 316 U/L (ref 20–200)
CO2 SERPL-SCNC: 28.4 MMOL/L (ref 22–29)
CREAT SERPL-MCNC: 0.88 MG/DL (ref 0.76–1.27)
EGFRCR SERPLBLD CKD-EPI 2021: 108.7 ML/MIN/1.73
EOSINOPHIL # BLD AUTO: 0.04 10*3/MM3 (ref 0–0.4)
EOSINOPHIL NFR BLD AUTO: 0.7 % (ref 0.3–6.2)
ERYTHROCYTE [DISTWIDTH] IN BLOOD BY AUTOMATED COUNT: 12.9 % (ref 12.3–15.4)
GLOBULIN SER CALC-MCNC: 2.7 GM/DL
GLUCOSE SERPL-MCNC: 112 MG/DL (ref 65–99)
HBA1C MFR BLD: 6.3 % (ref 4.8–5.6)
HCT VFR BLD AUTO: 46.2 % (ref 37.5–51)
HGB BLD-MCNC: 15.9 G/DL (ref 13–17.7)
IMM GRANULOCYTES # BLD AUTO: 0.05 10*3/MM3 (ref 0–0.05)
IMM GRANULOCYTES NFR BLD AUTO: 0.8 % (ref 0–0.5)
LYMPHOCYTES # BLD AUTO: 1.19 10*3/MM3 (ref 0.7–3.1)
LYMPHOCYTES NFR BLD AUTO: 19.7 % (ref 19.6–45.3)
MCH RBC QN AUTO: 31.7 PG (ref 26.6–33)
MCHC RBC AUTO-ENTMCNC: 34.4 G/DL (ref 31.5–35.7)
MCV RBC AUTO: 92 FL (ref 79–97)
MONOCYTES # BLD AUTO: 0.84 10*3/MM3 (ref 0.1–0.9)
MONOCYTES NFR BLD AUTO: 13.9 % (ref 5–12)
NEUTROPHILS # BLD AUTO: 3.9 10*3/MM3 (ref 1.7–7)
NEUTROPHILS NFR BLD AUTO: 64.4 % (ref 42.7–76)
NRBC BLD AUTO-RTO: 0 /100 WBC (ref 0–0.2)
PLATELET # BLD AUTO: 251 10*3/MM3 (ref 140–450)
POTASSIUM SERPL-SCNC: 4.6 MMOL/L (ref 3.5–5.2)
PROT SERPL-MCNC: 7.3 G/DL (ref 6–8.5)
RBC # BLD AUTO: 5.02 10*6/MM3 (ref 4.14–5.8)
SODIUM SERPL-SCNC: 139 MMOL/L (ref 136–145)
WBC # BLD AUTO: 6.05 10*3/MM3 (ref 3.4–10.8)

## 2025-08-04 PROBLEM — R25.2 MUSCLE CRAMPS: Status: ACTIVE | Noted: 2025-08-04

## 2025-08-04 PROBLEM — R52 GENERALIZED BODY ACHES: Status: ACTIVE | Noted: 2025-08-04

## 2025-08-07 ENCOUNTER — TELEPHONE (OUTPATIENT)
Dept: FAMILY MEDICINE CLINIC | Facility: CLINIC | Age: 45
End: 2025-08-07
Payer: COMMERCIAL